# Patient Record
Sex: MALE | Race: WHITE | Employment: FULL TIME | URBAN - METROPOLITAN AREA
[De-identification: names, ages, dates, MRNs, and addresses within clinical notes are randomized per-mention and may not be internally consistent; named-entity substitution may affect disease eponyms.]

---

## 2023-03-15 ENCOUNTER — APPOINTMENT (OUTPATIENT)
Dept: GENERAL RADIOLOGY | Age: 43
DRG: 494 | End: 2023-03-15
Payer: COMMERCIAL

## 2023-03-15 ENCOUNTER — ANESTHESIA (OUTPATIENT)
Dept: OPERATING ROOM | Age: 43
DRG: 494 | End: 2023-03-15
Payer: COMMERCIAL

## 2023-03-15 ENCOUNTER — APPOINTMENT (OUTPATIENT)
Dept: CT IMAGING | Age: 43
DRG: 494 | End: 2023-03-15
Payer: COMMERCIAL

## 2023-03-15 ENCOUNTER — HOSPITAL ENCOUNTER (INPATIENT)
Age: 43
LOS: 1 days | Discharge: HOME | DRG: 494 | End: 2023-03-16
Attending: EMERGENCY MEDICINE | Admitting: INTERNAL MEDICINE
Payer: COMMERCIAL

## 2023-03-15 ENCOUNTER — ANESTHESIA EVENT (OUTPATIENT)
Dept: OPERATING ROOM | Age: 43
DRG: 494 | End: 2023-03-15
Payer: COMMERCIAL

## 2023-03-15 DIAGNOSIS — S82.842A BIMALLEOLAR ANKLE FRACTURE, LEFT, CLOSED, INITIAL ENCOUNTER: Primary | ICD-10-CM

## 2023-03-15 DIAGNOSIS — S82.892A ANKLE FRACTURE, LEFT, CLOSED, INITIAL ENCOUNTER: Primary | ICD-10-CM

## 2023-03-15 PROBLEM — I10 HTN (HYPERTENSION): Status: ACTIVE | Noted: 2023-03-15

## 2023-03-15 PROBLEM — N18.9 CKD (CHRONIC KIDNEY DISEASE): Status: ACTIVE | Noted: 2023-03-15

## 2023-03-15 PROBLEM — D86.9 SARCOID: Status: ACTIVE | Noted: 2023-03-15

## 2023-03-15 LAB
ALBUMIN SERPL-MCNC: 3.8 G/DL (ref 3.4–5)
ALBUMIN/GLOB SERPL: 1.4 {RATIO} (ref 1.1–2.2)
ALP SERPL-CCNC: 132 U/L (ref 40–129)
ALT SERPL-CCNC: 60 U/L (ref 10–40)
ANION GAP SERPL CALCULATED.3IONS-SCNC: 14 MMOL/L (ref 3–16)
AST SERPL-CCNC: 79 U/L (ref 15–37)
BASOPHILS # BLD: 0.1 K/UL (ref 0–0.2)
BASOPHILS NFR BLD: 0.8 %
BILIRUB SERPL-MCNC: 0.5 MG/DL (ref 0–1)
BUN SERPL-MCNC: 20 MG/DL (ref 7–20)
CALCIUM SERPL-MCNC: 8.9 MG/DL (ref 8.3–10.6)
CHLORIDE SERPL-SCNC: 100 MMOL/L (ref 99–110)
CO2 SERPL-SCNC: 24 MMOL/L (ref 21–32)
CREAT SERPL-MCNC: 1.6 MG/DL (ref 0.9–1.3)
DEPRECATED RDW RBC AUTO: 13.7 % (ref 12.4–15.4)
EOSINOPHIL # BLD: 0.2 K/UL (ref 0–0.6)
EOSINOPHIL NFR BLD: 2.7 %
GFR SERPLBLD CREATININE-BSD FMLA CKD-EPI: 55 ML/MIN/{1.73_M2}
GLUCOSE SERPL-MCNC: 152 MG/DL (ref 70–99)
HCT VFR BLD AUTO: 45.1 % (ref 40.5–52.5)
HGB BLD-MCNC: 15.4 G/DL (ref 13.5–17.5)
LYMPHOCYTES # BLD: 1.2 K/UL (ref 1–5.1)
LYMPHOCYTES NFR BLD: 17.2 %
MCH RBC QN AUTO: 30.6 PG (ref 26–34)
MCHC RBC AUTO-ENTMCNC: 34.2 G/DL (ref 31–36)
MCV RBC AUTO: 89.5 FL (ref 80–100)
MONOCYTES # BLD: 0.5 K/UL (ref 0–1.3)
MONOCYTES NFR BLD: 7.9 %
NEUTROPHILS # BLD: 4.9 K/UL (ref 1.7–7.7)
NEUTROPHILS NFR BLD: 71.4 %
PLATELET # BLD AUTO: 209 K/UL (ref 135–450)
PMV BLD AUTO: 9.1 FL (ref 5–10.5)
POTASSIUM SERPL-SCNC: 3.8 MMOL/L (ref 3.5–5.1)
PROT SERPL-MCNC: 6.6 G/DL (ref 6.4–8.2)
RBC # BLD AUTO: 5.04 M/UL (ref 4.2–5.9)
SODIUM SERPL-SCNC: 138 MMOL/L (ref 136–145)
WBC # BLD AUTO: 6.9 K/UL (ref 4–11)

## 2023-03-15 PROCEDURE — 73600 X-RAY EXAM OF ANKLE: CPT

## 2023-03-15 PROCEDURE — 85025 COMPLETE CBC W/AUTO DIFF WBC: CPT

## 2023-03-15 PROCEDURE — 70450 CT HEAD/BRAIN W/O DYE: CPT

## 2023-03-15 PROCEDURE — C1713 ANCHOR/SCREW BN/BN,TIS/BN: HCPCS | Performed by: ORTHOPAEDIC SURGERY

## 2023-03-15 PROCEDURE — 2580000003 HC RX 258: Performed by: NURSE PRACTITIONER

## 2023-03-15 PROCEDURE — 2500000003 HC RX 250 WO HCPCS: Performed by: ORTHOPAEDIC SURGERY

## 2023-03-15 PROCEDURE — 2580000003 HC RX 258: Performed by: INTERNAL MEDICINE

## 2023-03-15 PROCEDURE — 99285 EMERGENCY DEPT VISIT HI MDM: CPT

## 2023-03-15 PROCEDURE — 73610 X-RAY EXAM OF ANKLE: CPT

## 2023-03-15 PROCEDURE — 3600000004 HC SURGERY LEVEL 4 BASE: Performed by: ORTHOPAEDIC SURGERY

## 2023-03-15 PROCEDURE — 6360000002 HC RX W HCPCS: Performed by: ORTHOPAEDIC SURGERY

## 2023-03-15 PROCEDURE — 2500000003 HC RX 250 WO HCPCS: Performed by: NURSE ANESTHETIST, CERTIFIED REGISTERED

## 2023-03-15 PROCEDURE — 6360000002 HC RX W HCPCS: Performed by: NURSE ANESTHETIST, CERTIFIED REGISTERED

## 2023-03-15 PROCEDURE — 6360000002 HC RX W HCPCS: Performed by: INTERNAL MEDICINE

## 2023-03-15 PROCEDURE — 73590 X-RAY EXAM OF LOWER LEG: CPT

## 2023-03-15 PROCEDURE — 96374 THER/PROPH/DIAG INJ IV PUSH: CPT

## 2023-03-15 PROCEDURE — 7100000000 HC PACU RECOVERY - FIRST 15 MIN: Performed by: ORTHOPAEDIC SURGERY

## 2023-03-15 PROCEDURE — 2580000003 HC RX 258: Performed by: ORTHOPAEDIC SURGERY

## 2023-03-15 PROCEDURE — 6370000000 HC RX 637 (ALT 250 FOR IP): Performed by: NURSE PRACTITIONER

## 2023-03-15 PROCEDURE — 3600000014 HC SURGERY LEVEL 4 ADDTL 15MIN: Performed by: ORTHOPAEDIC SURGERY

## 2023-03-15 PROCEDURE — 7100000001 HC PACU RECOVERY - ADDTL 15 MIN: Performed by: ORTHOPAEDIC SURGERY

## 2023-03-15 PROCEDURE — A4217 STERILE WATER/SALINE, 500 ML: HCPCS | Performed by: ORTHOPAEDIC SURGERY

## 2023-03-15 PROCEDURE — 80053 COMPREHEN METABOLIC PANEL: CPT

## 2023-03-15 PROCEDURE — 27840 TREAT ANKLE DISLOCATION: CPT

## 2023-03-15 PROCEDURE — 0QSK04Z REPOSITION LEFT FIBULA WITH INTERNAL FIXATION DEVICE, OPEN APPROACH: ICD-10-PCS | Performed by: ORTHOPAEDIC SURGERY

## 2023-03-15 PROCEDURE — 2720000010 HC SURG SUPPLY STERILE: Performed by: ORTHOPAEDIC SURGERY

## 2023-03-15 PROCEDURE — C1769 GUIDE WIRE: HCPCS | Performed by: ORTHOPAEDIC SURGERY

## 2023-03-15 PROCEDURE — 6360000002 HC RX W HCPCS: Performed by: EMERGENCY MEDICINE

## 2023-03-15 PROCEDURE — 6370000000 HC RX 637 (ALT 250 FOR IP): Performed by: INTERNAL MEDICINE

## 2023-03-15 PROCEDURE — 2500000003 HC RX 250 WO HCPCS: Performed by: EMERGENCY MEDICINE

## 2023-03-15 PROCEDURE — 2709999900 HC NON-CHARGEABLE SUPPLY: Performed by: ORTHOPAEDIC SURGERY

## 2023-03-15 PROCEDURE — 3700000000 HC ANESTHESIA ATTENDED CARE: Performed by: ORTHOPAEDIC SURGERY

## 2023-03-15 PROCEDURE — 3700000001 HC ADD 15 MINUTES (ANESTHESIA): Performed by: ORTHOPAEDIC SURGERY

## 2023-03-15 PROCEDURE — 1200000000 HC SEMI PRIVATE

## 2023-03-15 PROCEDURE — 6360000002 HC RX W HCPCS

## 2023-03-15 PROCEDURE — APPNB45 APP NON BILLABLE 31-45 MINUTES: Performed by: NURSE PRACTITIONER

## 2023-03-15 PROCEDURE — 36415 COLL VENOUS BLD VENIPUNCTURE: CPT

## 2023-03-15 DEVICE — SCREW BNE L50MM DIA4MM S STL CANN LNG HALF THRD SM HEX SOCK: Type: IMPLANTABLE DEVICE | Site: ANKLE | Status: FUNCTIONAL

## 2023-03-15 DEVICE — SCREW BNE L20MM DIA2.7MM CORT S STL ST LOK FULL THRD T8: Type: IMPLANTABLE DEVICE | Site: ANKLE | Status: FUNCTIONAL

## 2023-03-15 DEVICE — SCREW BNE L18MM DIA2.7MM CORT S STL ST LOK FULL THRD T8: Type: IMPLANTABLE DEVICE | Site: ANKLE | Status: FUNCTIONAL

## 2023-03-15 DEVICE — SCREW BNE L22MM DIA2.7MM CORT S STL ST LOK FULL THRD T8: Type: IMPLANTABLE DEVICE | Site: ANKLE | Status: FUNCTIONAL

## 2023-03-15 DEVICE — SCREW BNE L14MM DIA3.5MM CORT S STL ST NONCANNULATED LOK: Type: IMPLANTABLE DEVICE | Site: ANKLE | Status: FUNCTIONAL

## 2023-03-15 DEVICE — SCREW BNE L16MM DIA2.7MM CORT S STL ST LOK FULL THRD T8: Type: IMPLANTABLE DEVICE | Site: ANKLE | Status: FUNCTIONAL

## 2023-03-15 DEVICE — GRAFT BNE SUB 15CC PARTIC 4-9.5MM CANC FRZ DRY CHIP 27615015] ALLOSOURCE]: Type: IMPLANTABLE DEVICE | Site: ANKLE | Status: FUNCTIONAL

## 2023-03-15 DEVICE — SCREW BNE L16MM DIA3.5MM CORT S STL ST NONCANNULATED LOK: Type: IMPLANTABLE DEVICE | Site: ANKLE | Status: FUNCTIONAL

## 2023-03-15 DEVICE — SCREW BNE L14MM DIA2.7MM CORT S STL ST LOK FULL THRD T8: Type: IMPLANTABLE DEVICE | Site: ANKLE | Status: FUNCTIONAL

## 2023-03-15 DEVICE — PLATE BNE L99MM 5 H L DST LAT FIBULAR S STL LOK COMPR FOR: Type: IMPLANTABLE DEVICE | Site: ANKLE | Status: FUNCTIONAL

## 2023-03-15 RX ORDER — FENTANYL CITRATE 50 UG/ML
INJECTION, SOLUTION INTRAMUSCULAR; INTRAVENOUS PRN
Status: DISCONTINUED | OUTPATIENT
Start: 2023-03-15 | End: 2023-03-15 | Stop reason: SDUPTHER

## 2023-03-15 RX ORDER — PROPOFOL 10 MG/ML
10 INJECTION, EMULSION INTRAVENOUS CONTINUOUS
Status: DISCONTINUED | OUTPATIENT
Start: 2023-03-15 | End: 2023-03-15 | Stop reason: HOSPADM

## 2023-03-15 RX ORDER — LABETALOL HYDROCHLORIDE 5 MG/ML
10 INJECTION, SOLUTION INTRAVENOUS
Status: DISCONTINUED | OUTPATIENT
Start: 2023-03-15 | End: 2023-03-15 | Stop reason: HOSPADM

## 2023-03-15 RX ORDER — PROPOFOL 10 MG/ML
INJECTION, EMULSION INTRAVENOUS PRN
Status: DISCONTINUED | OUTPATIENT
Start: 2023-03-15 | End: 2023-03-15 | Stop reason: SDUPTHER

## 2023-03-15 RX ORDER — ONDANSETRON 2 MG/ML
4 INJECTION INTRAMUSCULAR; INTRAVENOUS
Status: DISCONTINUED | OUTPATIENT
Start: 2023-03-15 | End: 2023-03-15 | Stop reason: HOSPADM

## 2023-03-15 RX ORDER — POLYETHYLENE GLYCOL 3350 17 G/17G
17 POWDER, FOR SOLUTION ORAL DAILY PRN
Status: DISCONTINUED | OUTPATIENT
Start: 2023-03-15 | End: 2023-03-16 | Stop reason: HOSPADM

## 2023-03-15 RX ORDER — OXYCODONE HYDROCHLORIDE 5 MG/1
5 TABLET ORAL
Status: DISCONTINUED | OUTPATIENT
Start: 2023-03-15 | End: 2023-03-15 | Stop reason: HOSPADM

## 2023-03-15 RX ORDER — KETAMINE HCL IN NACL, ISO-OSM 100MG/10ML
SYRINGE (ML) INJECTION PRN
Status: DISCONTINUED | OUTPATIENT
Start: 2023-03-15 | End: 2023-03-15 | Stop reason: SDUPTHER

## 2023-03-15 RX ORDER — ALLOPURINOL 300 MG/1
TABLET ORAL
COMMUNITY
Start: 2021-11-10

## 2023-03-15 RX ORDER — SODIUM CHLORIDE 0.9 % (FLUSH) 0.9 %
5-40 SYRINGE (ML) INJECTION PRN
Status: DISCONTINUED | OUTPATIENT
Start: 2023-03-15 | End: 2023-03-16 | Stop reason: HOSPADM

## 2023-03-15 RX ORDER — COLCHICINE 0.6 MG/1
TABLET ORAL
COMMUNITY
Start: 2021-11-10

## 2023-03-15 RX ORDER — DEXAMETHASONE SODIUM PHOSPHATE 4 MG/ML
INJECTION, SOLUTION INTRA-ARTICULAR; INTRALESIONAL; INTRAMUSCULAR; INTRAVENOUS; SOFT TISSUE PRN
Status: DISCONTINUED | OUTPATIENT
Start: 2023-03-15 | End: 2023-03-15 | Stop reason: SDUPTHER

## 2023-03-15 RX ORDER — HYDROMORPHONE HYDROCHLORIDE 1 MG/ML
1 INJECTION, SOLUTION INTRAMUSCULAR; INTRAVENOUS; SUBCUTANEOUS EVERY 4 HOURS PRN
Status: DISCONTINUED | OUTPATIENT
Start: 2023-03-15 | End: 2023-03-16 | Stop reason: HOSPADM

## 2023-03-15 RX ORDER — HYDROXYCHLOROQUINE SULFATE 200 MG/1
TABLET, FILM COATED ORAL
COMMUNITY
Start: 2016-11-07

## 2023-03-15 RX ORDER — DEXMEDETOMIDINE HYDROCHLORIDE 100 UG/ML
INJECTION, SOLUTION INTRAVENOUS PRN
Status: DISCONTINUED | OUTPATIENT
Start: 2023-03-15 | End: 2023-03-15 | Stop reason: SDUPTHER

## 2023-03-15 RX ORDER — SODIUM CHLORIDE 9 MG/ML
INJECTION, SOLUTION INTRAVENOUS CONTINUOUS
Status: DISCONTINUED | OUTPATIENT
Start: 2023-03-15 | End: 2023-03-16 | Stop reason: HOSPADM

## 2023-03-15 RX ORDER — ONDANSETRON 2 MG/ML
INJECTION INTRAMUSCULAR; INTRAVENOUS PRN
Status: DISCONTINUED | OUTPATIENT
Start: 2023-03-15 | End: 2023-03-15 | Stop reason: SDUPTHER

## 2023-03-15 RX ORDER — MAGNESIUM HYDROXIDE 1200 MG/15ML
LIQUID ORAL CONTINUOUS PRN
Status: COMPLETED | OUTPATIENT
Start: 2023-03-15 | End: 2023-03-15

## 2023-03-15 RX ORDER — ALLOPURINOL 300 MG/1
300 TABLET ORAL DAILY
Status: DISCONTINUED | OUTPATIENT
Start: 2023-03-15 | End: 2023-03-16 | Stop reason: HOSPADM

## 2023-03-15 RX ORDER — HYDROMORPHONE HYDROCHLORIDE 1 MG/ML
0.5 INJECTION, SOLUTION INTRAMUSCULAR; INTRAVENOUS; SUBCUTANEOUS EVERY 4 HOURS PRN
Status: DISCONTINUED | OUTPATIENT
Start: 2023-03-15 | End: 2023-03-16 | Stop reason: HOSPADM

## 2023-03-15 RX ORDER — MORPHINE SULFATE 4 MG/ML
4 INJECTION, SOLUTION INTRAMUSCULAR; INTRAVENOUS ONCE
Status: COMPLETED | OUTPATIENT
Start: 2023-03-15 | End: 2023-03-15

## 2023-03-15 RX ORDER — HYDROMORPHONE HYDROCHLORIDE 1 MG/ML
1 INJECTION, SOLUTION INTRAMUSCULAR; INTRAVENOUS; SUBCUTANEOUS ONCE
Status: DISCONTINUED | OUTPATIENT
Start: 2023-03-15 | End: 2023-03-15

## 2023-03-15 RX ORDER — OXYCODONE HYDROCHLORIDE 5 MG/1
10 TABLET ORAL EVERY 4 HOURS PRN
Status: DISCONTINUED | OUTPATIENT
Start: 2023-03-15 | End: 2023-03-16 | Stop reason: HOSPADM

## 2023-03-15 RX ORDER — LIDOCAINE HYDROCHLORIDE 20 MG/ML
INJECTION, SOLUTION EPIDURAL; INFILTRATION; INTRACAUDAL; PERINEURAL PRN
Status: DISCONTINUED | OUTPATIENT
Start: 2023-03-15 | End: 2023-03-15 | Stop reason: SDUPTHER

## 2023-03-15 RX ORDER — MIDAZOLAM HYDROCHLORIDE 1 MG/ML
INJECTION INTRAMUSCULAR; INTRAVENOUS PRN
Status: DISCONTINUED | OUTPATIENT
Start: 2023-03-15 | End: 2023-03-15 | Stop reason: SDUPTHER

## 2023-03-15 RX ORDER — LOSARTAN POTASSIUM 25 MG/1
50 TABLET ORAL DAILY
Status: DISCONTINUED | OUTPATIENT
Start: 2023-03-15 | End: 2023-03-16 | Stop reason: HOSPADM

## 2023-03-15 RX ORDER — NICOTINE 21 MG/24HR
1 PATCH, TRANSDERMAL 24 HOURS TRANSDERMAL DAILY
Status: DISCONTINUED | OUTPATIENT
Start: 2023-03-15 | End: 2023-03-16 | Stop reason: HOSPADM

## 2023-03-15 RX ORDER — SODIUM CHLORIDE 0.9 % (FLUSH) 0.9 %
5-40 SYRINGE (ML) INJECTION EVERY 12 HOURS SCHEDULED
Status: DISCONTINUED | OUTPATIENT
Start: 2023-03-15 | End: 2023-03-16 | Stop reason: HOSPADM

## 2023-03-15 RX ORDER — LOSARTAN POTASSIUM 50 MG/1
TABLET ORAL DAILY
COMMUNITY
Start: 2021-08-20

## 2023-03-15 RX ORDER — ENOXAPARIN SODIUM 100 MG/ML
40 INJECTION SUBCUTANEOUS DAILY
Status: DISCONTINUED | OUTPATIENT
Start: 2023-03-16 | End: 2023-03-15

## 2023-03-15 RX ORDER — ACETAMINOPHEN 650 MG/1
650 SUPPOSITORY RECTAL EVERY 6 HOURS PRN
Status: DISCONTINUED | OUTPATIENT
Start: 2023-03-15 | End: 2023-03-16 | Stop reason: HOSPADM

## 2023-03-15 RX ORDER — OXYCODONE HYDROCHLORIDE 5 MG/1
5 TABLET ORAL EVERY 4 HOURS PRN
Status: DISCONTINUED | OUTPATIENT
Start: 2023-03-15 | End: 2023-03-16 | Stop reason: HOSPADM

## 2023-03-15 RX ORDER — ROCURONIUM BROMIDE 10 MG/ML
INJECTION, SOLUTION INTRAVENOUS PRN
Status: DISCONTINUED | OUTPATIENT
Start: 2023-03-15 | End: 2023-03-15 | Stop reason: SDUPTHER

## 2023-03-15 RX ORDER — ENOXAPARIN SODIUM 100 MG/ML
40 INJECTION SUBCUTANEOUS DAILY
Status: DISCONTINUED | OUTPATIENT
Start: 2023-03-16 | End: 2023-03-16 | Stop reason: HOSPADM

## 2023-03-15 RX ORDER — KETAMINE HYDROCHLORIDE 50 MG/ML
1 INJECTION, SOLUTION, CONCENTRATE INTRAMUSCULAR; INTRAVENOUS ONCE
Status: COMPLETED | OUTPATIENT
Start: 2023-03-15 | End: 2023-03-15

## 2023-03-15 RX ORDER — PROPOFOL 10 MG/ML
5-50 INJECTION, EMULSION INTRAVENOUS ONCE
Status: DISCONTINUED | OUTPATIENT
Start: 2023-03-15 | End: 2023-03-15

## 2023-03-15 RX ORDER — ENOXAPARIN SODIUM 100 MG/ML
30 INJECTION SUBCUTANEOUS 2 TIMES DAILY
Status: DISCONTINUED | OUTPATIENT
Start: 2023-03-16 | End: 2023-03-15

## 2023-03-15 RX ORDER — MEPERIDINE HYDROCHLORIDE 25 MG/ML
12.5 INJECTION INTRAMUSCULAR; INTRAVENOUS; SUBCUTANEOUS EVERY 5 MIN PRN
Status: DISCONTINUED | OUTPATIENT
Start: 2023-03-15 | End: 2023-03-15 | Stop reason: HOSPADM

## 2023-03-15 RX ORDER — BUPIVACAINE HYDROCHLORIDE 5 MG/ML
INJECTION, SOLUTION EPIDURAL; INTRACAUDAL
Status: COMPLETED | OUTPATIENT
Start: 2023-03-15 | End: 2023-03-15

## 2023-03-15 RX ORDER — PREDNISONE 1 MG/1
5 TABLET ORAL DAILY
Status: DISCONTINUED | OUTPATIENT
Start: 2023-03-15 | End: 2023-03-16 | Stop reason: HOSPADM

## 2023-03-15 RX ORDER — MORPHINE SULFATE 4 MG/ML
4 INJECTION, SOLUTION INTRAMUSCULAR; INTRAVENOUS ONCE
Status: DISCONTINUED | OUTPATIENT
Start: 2023-03-15 | End: 2023-03-15

## 2023-03-15 RX ORDER — KETAMINE HYDROCHLORIDE 50 MG/ML
1 INJECTION, SOLUTION, CONCENTRATE INTRAMUSCULAR; INTRAVENOUS ONCE
Status: DISCONTINUED | OUTPATIENT
Start: 2023-03-15 | End: 2023-03-15

## 2023-03-15 RX ORDER — SODIUM CHLORIDE 9 MG/ML
INJECTION, SOLUTION INTRAVENOUS PRN
Status: DISCONTINUED | OUTPATIENT
Start: 2023-03-15 | End: 2023-03-16 | Stop reason: HOSPADM

## 2023-03-15 RX ORDER — HYDROMORPHONE HCL 110MG/55ML
0.5 PATIENT CONTROLLED ANALGESIA SYRINGE INTRAVENOUS EVERY 5 MIN PRN
Status: DISCONTINUED | OUTPATIENT
Start: 2023-03-15 | End: 2023-03-15 | Stop reason: HOSPADM

## 2023-03-15 RX ORDER — HYDROMORPHONE HCL 110MG/55ML
PATIENT CONTROLLED ANALGESIA SYRINGE INTRAVENOUS PRN
Status: DISCONTINUED | OUTPATIENT
Start: 2023-03-15 | End: 2023-03-15 | Stop reason: SDUPTHER

## 2023-03-15 RX ORDER — PROPOFOL 10 MG/ML
INJECTION, EMULSION INTRAVENOUS
Status: COMPLETED
Start: 2023-03-15 | End: 2023-03-15

## 2023-03-15 RX ORDER — HYDRALAZINE HYDROCHLORIDE 20 MG/ML
10 INJECTION INTRAMUSCULAR; INTRAVENOUS
Status: DISCONTINUED | OUTPATIENT
Start: 2023-03-15 | End: 2023-03-15 | Stop reason: HOSPADM

## 2023-03-15 RX ORDER — ACETAMINOPHEN 325 MG/1
650 TABLET ORAL EVERY 6 HOURS PRN
Status: DISCONTINUED | OUTPATIENT
Start: 2023-03-15 | End: 2023-03-16 | Stop reason: HOSPADM

## 2023-03-15 RX ADMIN — Medication 20 MG: at 16:29

## 2023-03-15 RX ADMIN — KETAMINE HYDROCHLORIDE 115 MG: 50 INJECTION, SOLUTION INTRAMUSCULAR; INTRAVENOUS at 04:35

## 2023-03-15 RX ADMIN — HYDROMORPHONE HYDROCHLORIDE 1 MG: 1 INJECTION, SOLUTION INTRAMUSCULAR; INTRAVENOUS; SUBCUTANEOUS at 07:00

## 2023-03-15 RX ADMIN — Medication 5 ML: at 10:06

## 2023-03-15 RX ADMIN — MIDAZOLAM 2 MG: 1 INJECTION INTRAMUSCULAR; INTRAVENOUS at 15:53

## 2023-03-15 RX ADMIN — SODIUM CHLORIDE: 9 INJECTION, SOLUTION INTRAVENOUS at 10:04

## 2023-03-15 RX ADMIN — Medication 3000 MG: at 15:42

## 2023-03-15 RX ADMIN — HYDROMORPHONE HYDROCHLORIDE 0.5 MG: 2 INJECTION, SOLUTION INTRAMUSCULAR; INTRAVENOUS; SUBCUTANEOUS at 16:29

## 2023-03-15 RX ADMIN — PREDNISONE 5 MG: 5 TABLET ORAL at 10:00

## 2023-03-15 RX ADMIN — Medication 30 MG: at 16:10

## 2023-03-15 RX ADMIN — PROPOFOL 60 MG: 10 INJECTION, EMULSION INTRAVENOUS at 17:40

## 2023-03-15 RX ADMIN — ALLOPURINOL 300 MG: 300 TABLET ORAL at 10:00

## 2023-03-15 RX ADMIN — LIDOCAINE HYDROCHLORIDE 80 MG: 20 INJECTION, SOLUTION EPIDURAL; INFILTRATION; INTRACAUDAL; PERINEURAL at 15:57

## 2023-03-15 RX ADMIN — OXYCODONE 10 MG: 5 TABLET ORAL at 13:13

## 2023-03-15 RX ADMIN — MORPHINE SULFATE 4 MG: 4 INJECTION, SOLUTION INTRAMUSCULAR; INTRAVENOUS at 03:25

## 2023-03-15 RX ADMIN — DEXMEDETOMIDINE HYDROCHLORIDE 4 MCG: 100 INJECTION, SOLUTION INTRAVENOUS at 16:12

## 2023-03-15 RX ADMIN — ROCURONIUM BROMIDE 10 MG: 10 INJECTION, SOLUTION INTRAVENOUS at 16:30

## 2023-03-15 RX ADMIN — DEXMEDETOMIDINE HYDROCHLORIDE 4 MCG: 100 INJECTION, SOLUTION INTRAVENOUS at 17:20

## 2023-03-15 RX ADMIN — DEXAMETHASONE SODIUM PHOSPHATE 8 MG: 4 INJECTION, SOLUTION INTRAMUSCULAR; INTRAVENOUS at 16:15

## 2023-03-15 RX ADMIN — HYDROMORPHONE HYDROCHLORIDE 1 MG: 1 INJECTION, SOLUTION INTRAMUSCULAR; INTRAVENOUS; SUBCUTANEOUS at 11:08

## 2023-03-15 RX ADMIN — LOSARTAN POTASSIUM 50 MG: 25 TABLET, FILM COATED ORAL at 09:59

## 2023-03-15 RX ADMIN — PROPOFOL 10 MG: 10 INJECTION, EMULSION INTRAVENOUS at 04:35

## 2023-03-15 RX ADMIN — OXYCODONE 10 MG: 5 TABLET ORAL at 09:05

## 2023-03-15 RX ADMIN — OXYCODONE HYDROCHLORIDE 5 MG: 5 TABLET ORAL at 21:39

## 2023-03-15 RX ADMIN — DEXMEDETOMIDINE HYDROCHLORIDE 4 MCG: 100 INJECTION, SOLUTION INTRAVENOUS at 17:25

## 2023-03-15 RX ADMIN — HYDROMORPHONE HYDROCHLORIDE 0.5 MG: 2 INJECTION, SOLUTION INTRAMUSCULAR; INTRAVENOUS; SUBCUTANEOUS at 17:35

## 2023-03-15 RX ADMIN — SUGAMMADEX 200 MG: 100 INJECTION, SOLUTION INTRAVENOUS at 17:46

## 2023-03-15 RX ADMIN — HYDROMORPHONE HYDROCHLORIDE 0.5 MG: 2 INJECTION, SOLUTION INTRAMUSCULAR; INTRAVENOUS; SUBCUTANEOUS at 16:17

## 2023-03-15 RX ADMIN — DEXMEDETOMIDINE HYDROCHLORIDE 4 MCG: 100 INJECTION, SOLUTION INTRAVENOUS at 16:29

## 2023-03-15 RX ADMIN — PROPOFOL 200 MG: 10 INJECTION, EMULSION INTRAVENOUS at 15:57

## 2023-03-15 RX ADMIN — DEXMEDETOMIDINE HYDROCHLORIDE 4 MCG: 100 INJECTION, SOLUTION INTRAVENOUS at 17:33

## 2023-03-15 RX ADMIN — FENTANYL CITRATE 100 MCG: 50 INJECTION, SOLUTION INTRAMUSCULAR; INTRAVENOUS at 15:57

## 2023-03-15 RX ADMIN — ROCURONIUM BROMIDE 50 MG: 10 INJECTION, SOLUTION INTRAVENOUS at 16:09

## 2023-03-15 RX ADMIN — ONDANSETRON 4 MG: 2 INJECTION INTRAMUSCULAR; INTRAVENOUS at 17:24

## 2023-03-15 RX ADMIN — HYDROMORPHONE HYDROCHLORIDE 0.5 MG: 2 INJECTION, SOLUTION INTRAMUSCULAR; INTRAVENOUS; SUBCUTANEOUS at 17:25

## 2023-03-15 ASSESSMENT — PAIN SCALES - GENERAL
PAINLEVEL_OUTOF10: 5
PAINLEVEL_OUTOF10: 5
PAINLEVEL_OUTOF10: 3
PAINLEVEL_OUTOF10: 8
PAINLEVEL_OUTOF10: 7
PAINLEVEL_OUTOF10: 5
PAINLEVEL_OUTOF10: 7
PAINLEVEL_OUTOF10: 3
PAINLEVEL_OUTOF10: 2
PAINLEVEL_OUTOF10: 6
PAINLEVEL_OUTOF10: 5
PAINLEVEL_OUTOF10: 5
PAINLEVEL_OUTOF10: 7
PAINLEVEL_OUTOF10: 10

## 2023-03-15 ASSESSMENT — PAIN - FUNCTIONAL ASSESSMENT
PAIN_FUNCTIONAL_ASSESSMENT: 0-10
PAIN_FUNCTIONAL_ASSESSMENT: 0-10
PAIN_FUNCTIONAL_ASSESSMENT: PREVENTS OR INTERFERES WITH ALL ACTIVE AND SOME PASSIVE ACTIVITIES
PAIN_FUNCTIONAL_ASSESSMENT: PREVENTS OR INTERFERES SOME ACTIVE ACTIVITIES AND ADLS

## 2023-03-15 ASSESSMENT — PAIN DESCRIPTION - LOCATION
LOCATION: ANKLE

## 2023-03-15 ASSESSMENT — LIFESTYLE VARIABLES
SMOKING_STATUS: 1
HOW MANY STANDARD DRINKS CONTAINING ALCOHOL DO YOU HAVE ON A TYPICAL DAY: PATIENT DOES NOT DRINK
HOW OFTEN DO YOU HAVE A DRINK CONTAINING ALCOHOL: NEVER

## 2023-03-15 ASSESSMENT — PAIN DESCRIPTION - ORIENTATION
ORIENTATION: LEFT

## 2023-03-15 ASSESSMENT — PAIN DESCRIPTION - DESCRIPTORS
DESCRIPTORS: ACHING
DESCRIPTORS: ACHING;SHOOTING
DESCRIPTORS: ACHING

## 2023-03-15 ASSESSMENT — PAIN DESCRIPTION - PAIN TYPE
TYPE: SURGICAL PAIN
TYPE: SURGICAL PAIN

## 2023-03-15 ASSESSMENT — ENCOUNTER SYMPTOMS: SHORTNESS OF BREATH: 1

## 2023-03-15 NOTE — ANESTHESIA POSTPROCEDURE EVALUATION
Department of Anesthesiology  Postprocedure Note    Patient: Gordon Amezquita  MRN: [id number]  YOB: 1980  Date of evaluation: 3/15/2023      Procedure Summary     Date: 03/15/23 Room / Location: [address]    Anesthesia Start: 1553 Anesthesia Stop: 3940    Procedure: OPEN REDUCTION INTERNAL FIXATION LEFT ANKLE-SYNTHES (Left) Diagnosis:       Closed left ankle fracture, initial encounter      (W43.083C LEFT ANKLE FRACTURE)    Surgeons: Hannah Rubin MD Responsible Provider: George Goins MD    Anesthesia Type: general ASA Status: 2          Anesthesia Type: No value filed.     Nahomi Phase I: Nahomi Score: 9    Nahomi Phase II:        Anesthesia Post Evaluation    Patient location during evaluation: PACU  Patient participation: complete - patient participated  Level of consciousness: awake and alert  Airway patency: patent  Nausea & Vomiting: no vomiting and no nausea  Complications: no  Cardiovascular status: hemodynamically stable  Respiratory status: acceptable  Hydration status: stable

## 2023-03-15 NOTE — LETTER
March 16, 2023       Marina Flores YOB: 1980   [address] [facility] Date of Visit:  3/15/2023       To Whom It May Concern: It is my medical opinion that Marina Flores should remain out of work until at least cleared by surgeon, not before 2 months post-op (surgery date was 3/15/23). If you have any questions or concerns, please don't hesitate to call.     Sincerely,          Fam Oreilly, CNP

## 2023-03-15 NOTE — ED PROVIDER NOTES
[address] [facility]  eMERGENCY dEPARTMENT eNCOUnter        Pt Name: Issa Tavares  MRN: [id number]  Armsdyangfmaximilian 1980  Date of evaluation: 3/15/2023  Provider: Kathie Malik MD  PCP: Abhay Mcleod MD      CHIEF COMPLAINT       Chief Complaint   Patient presents with    Fall     Pt arrives in the ED c/o ankle pain. Pt was on a ladder and fell off about 6 feet off the ground. Pt landed on his ankle 10/10 pain and hit his head. .  Pt stated his vision was double. Pt still has feeling in his toes        HISTORY OFPRESENT ILLNESS   (Location/Symptom, Timing/Onset, Context/Setting, Quality, Duration, Modifying Factors,Severity)  Note limiting factors. Issa Tavares is a 40 y.o. male patient was 6 feet up on a ladder and fell landing on his left foot thinks he may have hit his head but there was no loss of consciousness patient complains of left ankle pain little bit of pain in the left posterior chest denies any headache when the friend arrived he states he witnessed the fall and he caught his leg in the latter and that may have contributed to the twisting injury to the left ankle does have a history of chronic kidney disease and takes hydroxychloroquine and prednisone    Nursing Notes were all reviewed and agreed with or any disagreements were addressed  in the HPI. REVIEW OF SYSTEMS    (2-9 systems for level 4, 10 or more for level 5)       REVIEW OF SYSTEMS    Constitutional:  Denies fever, chills, or weakness   Eyes:  Denies vision changes  HENT:  Denies sore throat or neck pain   Respiratory:  Denies cough or shortness of breath   Cardiovascular:  Denies chest pain  GI:  Denies abdominal pain, nausea, vomiting, or diarrhea   Musculoskeletal:  Denies back pain   Skin: no rash or vesicles   Neurologic:  no headache weakness focal    Lymphatic:  no swollen  nodes   Psychiatric: no si or hs thoughts     All systems negative except as marked.      Positives and Pertinent negatives as per HPI. Except as noted above in the ROS, all other systems were reviewed andnegative. PASTMEDICAL HISTORY     Past Medical History:   Diagnosis Date    Chronic kidney disease     Hypercalcemia     Sarcoidosis          SURGICAL HISTORY       Past Surgical History:   Procedure Laterality Date    NECK SURGERY      ORTHOPEDIC SURGERY           CURRENT MEDICATIONS       Previous Medications    ALLOPURINOL (ZYLOPRIM) 300 MG TABLET    TAKE ONE BY MOUTH DAILY    AMLODIPINE (NORVASC) 2.5 MG TABLET    Take 2.5 mg by mouth daily    COLCHICINE (COLCRYS) 0.6 MG TABLET    TAKE ONE TABLET (0.6 MG TOTAL) BY MOUTH DAILY.  INDICATIONS: ACUTE GOUTY ARTHRITIS    FOLIC ACID PO    Take 1 mg by mouth daily     HYDROXYCHLOROQUINE (PLAQUENIL) 200 MG TABLET    TAKE ONE BY MOUTH TWICE DAILY    KETOCONAZOLE (NIZORAL) 200 MG TABLET    Take 400 mg by mouth daily Patient decided to break dose up to one tablet twice a day    LISINOPRIL (PRINIVIL;ZESTRIL) 10 MG TABLET    Take 10 mg by mouth 2 times daily    LOSARTAN (COZAAR) 50 MG TABLET    Take by mouth daily    POTASSIUM CHLORIDE SA (K-DUR;KLOR-CON M) 10 MEQ TABLET    Take 10 mEq by mouth daily    PREDNISONE 5 MG TBEC    Take 30 mg by mouth daily       ALLERGIES     Vitamin d, Aspartame and phenylalanine, Aspirin, and Magnesium    FAMILY HISTORY       Family History   Problem Relation Age of Onset    Cancer Mother         breast    Other Father         sarcoidosis    Cancer Father         prostate          SOCIAL HISTORY       Social History     Socioeconomic History    Marital status: Single     Spouse name: None    Number of children: 0    Years of education: None    Highest education level: None   Tobacco Use    Smoking status: Every Day     Packs/day: 2.00     Years: 15.00     Pack years: 30.00     Types: Cigarettes   Substance and Sexual Activity    Alcohol use: Yes     Comment: last drink maybe one week ago (11/17/15)    Drug use: Yes     Types: Marijuana (Weed) Comment: occ       SCREENINGS    Tyree Coma Scale  Eye Opening: Spontaneous  Best Verbal Response: Oriented  Best Motor Response: Obeys commands  Tyree Coma Scale Score: 15        PHYSICAL EXAM    (up to 7 for level 4, 8 or more for level 5)     ED Triage Vitals   BP Temp Temp src Heart Rate Resp SpO2 Height Weight   03/15/23 0247 03/15/23 0246 -- 03/15/23 0246 03/15/23 0247 03/15/23 0247 -- --   110/73 97.5 °F (36.4 °C)  93 22 96 %             General Appearance:  Alert, cooperative, no distress, appears stated age. Head:  Normocephalic, without obvious abnormality, atraumatic. Eyes:  conjunctiva/corneas clear, EOM's intact. Sclera anicteric. ENT: Mucous membranes moist.   Neck: Supple, symmetrical, trachea midline, no adenopathy. No jugular venous distention. Lungs:   No Respiratory Distress. no rales  rhonchi rub   Chest Wall:  Nontender  no deformity   Heart:  Rsr no murmer gallop    Abdomen:   Soft nontender no organomegally    Extremities:  Full range of motion. no deformity   Pulses: Equal  upper and lower    Skin:  No rashes or lesions to exposed skin. Neurologic: Alert and oriented X 3. Motor grossly normal.  Speech clear. Cr n 2-12 intact   Pt has swelling and deviation to lateral aspect  of ankle     DIAGNOSTIC RESULTS   LABS:    Labs Reviewed   COMPREHENSIVE METABOLIC PANEL W/ REFLEX TO MG FOR LOW K - Abnormal; Notable for the following components:       Result Value    Glucose 152 (*)     Creatinine 1.6 (*)     Est, Glom Filt Rate 55 (*)     Alkaline Phosphatase 132 (*)     ALT 60 (*)     AST 79 (*)     All other components within normal limits   CBC WITH AUTO DIFFERENTIAL       All other labs were within normal range or not returned as of thisdictation. EKG:  All EKG's are interpreted by the Emergency Department Physician who either signs or Co-signs this chart in the absence of a cardiologist.        RADIOLOGY:   Non-plain film images such as CT, Ultrasound and MRI are read by the tatyana Mckinley images are visualized and preliminarily interpreted by the  ED Provider with the belowfindings:        Interpretation per the Radiologist below, if available at the time of this note:    XR ANKLE LEFT (MIN 3 VIEWS)   Final Result   Unchanged alignment status post reduction and casting. XR ANKLE LEFT (MIN 3 VIEWS)   Final Result   Acute bimalleolar ankle fracture/dislocation. CT Head W/O Contrast   Final Result   No acute intracranial abnormality. XR TIBIA FIBULA LEFT (2 VIEWS)   Final Result   Acute bimalleolar ankle fracture/dislocation. PROCEDURES   Unless otherwise noted below, none     Procedures PROCEDURE:  PROCEDURAL SEDATION    Pre-sedation Assessment    Intended Procedure: Reduction of ankle dislocation    Pre-Procedure Assessment/Plan:  Airway:Normal  ASA 2 - Patient with mild systemic disease with no functional limitations    Level of Sedation Plan: Moderate sedation    Post Procedure plan: Return to same level of care    I assessed the patient and find that the patient is in satisfactory condition to proceed with the planned procedure and sedation plan. The benefits, risks, and alternatives of procedural sedation were discussed with David Snell or their surrogate. We discussed the potential side effects or adverse reactions that could occur with sedation with propofol and ketamine. An opportunity to ask questions was provided, and consent was given for the procedure. Oxygen was administered, and the appropriate pre-procedural policies were followed. Hospital protocol for cardiac, oxygen saturation, and blood pressure monitoring occurred. For details of the dosage administered, see the procedural sedation documentation. David Snell tolerated the medication and procedure without complications. David Snell woke up without difficulty, and was sent home with someone to evaluate them during the post-sedation period.   Mallampati score of 2  Total amount of inservice time: 12 minutes   Patient was sedated 10 mg propofol 110 mg ketamine traction and reduction of the left ankle fracture dislocation with some improvement and then lots of soft padding and sugar-tong posterior splint applied  CRITICAL CARE TIME   N/A      CONSULTS:  None    EMERGENCY DEPARTMENT COURSE and DIFFERENTIAL DIAGNOSIS/MDM:   Vitals:    Vitals:    03/15/23 0500 03/15/23 0515 03/15/23 0530 03/15/23 0545   BP: (!) 156/102 (!) 163/99 (!) 144/102 137/87   Pulse: (!) 101 (!) 107 (!) 103 (!) 102   Resp: 15 22 19 18   Temp:       SpO2: 98% 98% 95% 94%   Weight:           Patient was given the following medications:  Medications   propofol injection 10 mg (0 mg IntraVENous Stopped 3/15/23 0436)   morphine injection 4 mg (4 mg IntraVENous Given 3/15/23 0325)   ketamine (KETALAR) injection 115 mg (115 mg IntraVENous Given 3/15/23 0435)           Is this patient to be included in the SEP-1 Core Measure due to severe sepsis or septic shock? No   Exclusion criteria - the patient is NOT to be included for SEP-1 Core Measure due to: Infection is not suspected    Postreduction film does not look a lot better there is still dislocation but considering the severity of his bimalleolar fracture this is the best I could reduce spoke with orthopedics he will be admitted to the hospitalist service he will need surgery      FINAL IMPRESSION      1. Bimalleolar ankle fracture, left, closed, initial encounter        DISPOSITION/PLAN   DISPOSITION Decision To Admit 03/15/2023 06:16:40 AM      PATIENT REFERRED TO:  No follow-up provider specified.     DISCHARGE MEDICATIONS:  New Prescriptions    No medications on file       DISCONTINUED MEDICATIONS:  Discontinued Medications    No medications on file              (Please note that portions of this note were completed with a voice recognition program.  Efforts were made to edit the dictations but occasionally words aremis-transcribed.)    Natalio Olivares Nishant Reyes MD (electronically signed)          Chantal Thomas MD  03/15/23 3209

## 2023-03-15 NOTE — PLAN OF CARE
Problem: Safety - Adult  Goal: Free from fall injury  Outcome: Progressing     Problem: Discharge Planning  Goal: Discharge to home or other facility with appropriate resources  Outcome: Progressing     Problem: Pain  Goal: Verbalizes/displays adequate comfort level or baseline comfort level  Outcome: Progressing     Problem: Musculoskeletal - Adult  Goal: Return mobility to safest level of function  Outcome: Progressing  Goal: Maintain proper alignment of affected body part  Outcome: Progressing  Goal: Return ADL status to a safe level of function  Outcome: Progressing     Problem: Metabolic/Fluid and Electrolytes - Adult  Goal: Hemodynamic stability and optimal renal function maintained  Outcome: Progressing  Goal: Electrolytes maintained within normal limits  Outcome: Progressing  Goal: Glucose maintained within prescribed range  Outcome: Progressing     Problem: Skin/Tissue Integrity - Adult  Goal: Incisions, wounds, or drain sites healing without S/S of infection  Outcome: Progressing

## 2023-03-15 NOTE — PROGRESS NOTES
Pt resting in bed with eyes closed- responds to voice. VSS- on 2L NC satting mid 90s. Dressing to LLE CDI. L pedal pulse 2+. Pt states pain is tolerable and denies nausea. Dad sent up to room.  Phase one criteria met, will transfer to [facility]

## 2023-03-15 NOTE — PROGRESS NOTES
Pt consented for procedure today at 1400. He had 240 mL water at 0920, he has been instructed on remaining NPO & is agreeable. Consent form placed on chart, PIV flushes without complication.

## 2023-03-15 NOTE — PROGRESS NOTES
Due meds given with minimal sips of water. IVF initiated through PIV without complication. Pt declines nicotine patch at this time. Lights dim and pt bundled, he would like to rest at this time.

## 2023-03-15 NOTE — ED NOTES
Pt calm and resting quietly with equal rise and fall of chest. Pt in NAD, RR even and unlabored. Side rails up, bed locked and in lowest position. Pt updated on plan of care. No needs at this time. Pt instructed on use of call light if needed.       Lillian Cabrera RN  03/15/23 8079

## 2023-03-15 NOTE — PROGRESS NOTES
Patients pre-op assessment, checklist, H&P, and problem list reviewed during patient interview prior to going to surgery. normal

## 2023-03-15 NOTE — ANESTHESIA PRE PROCEDURE
Department of Anesthesiology  Preprocedure Note       Name:  Luzma Weiss   Age:  40 y.o.  :  1980                                          MRN:  [id number]         Date:  3/15/2023      Surgeon: Mitchell Blackwood):  Michelle Perla MD    Procedure: Procedure(s):  OPEN REDUCTION INTERNAL FIXATION LEFT ANKLE-SYNTHES    Medications prior to admission:   Prior to Admission medications    Medication Sig Start Date End Date Taking? Authorizing Provider   allopurinol (ZYLOPRIM) 300 MG tablet TAKE ONE BY MOUTH DAILY 11/10/21  Yes Historical Provider, MD   colchicine (COLCRYS) 0.6 MG tablet TAKE ONE TABLET (0.6 MG TOTAL) BY MOUTH DAILY.  INDICATIONS: ACUTE GOUTY ARTHRITIS 11/10/21  Yes Historical Provider, MD   hydroxychloroquine (PLAQUENIL) 200 MG tablet TAKE ONE BY MOUTH TWICE DAILY 16  Yes Historical Provider, MD   losartan (COZAAR) 50 MG tablet Take by mouth daily 21  Yes Historical Provider, MD   PredniSONE 5 MG TBEC Take 30 mg by mouth daily 11/25/15   To Boykin MD   lisinopril (PRINIVIL;ZESTRIL) 10 MG tablet Take 10 mg by mouth 2 times daily  Patient not taking: Reported on 3/15/2023    Historical Provider, MD   amLODIPine (NORVASC) 2.5 MG tablet Take 2.5 mg by mouth daily  Patient not taking: Reported on 3/15/2023    Historical Provider, MD   ketoconazole (NIZORAL) 200 MG tablet Take 400 mg by mouth daily Patient decided to break dose up to one tablet twice a day  Patient not taking: Reported on 3/15/2023    Historical Provider, MD   potassium chloride SA (K-DUR;KLOR-CON M) 10 MEQ tablet Take 10 mEq by mouth daily  Patient not taking: Reported on 3/15/2023    Historical Provider, MD   FOLIC ACID PO Take 1 mg by mouth daily   Patient not taking: Reported on 3/15/2023    Historical Provider, MD       Current medications:    Current Facility-Administered Medications   Medication Dose Route Frequency Provider Last Rate Last Admin    sodium chloride flush 0.9 % injection 5-40 mL  5-40 mL IntraVENous 2 times per day Macey Amezquita MD   5 mL at 03/15/23 1006    sodium chloride flush 0.9 % injection 5-40 mL  5-40 mL IntraVENous PRN Macey Amezquita MD        0.9 % sodium chloride infusion   IntraVENous PRN Macey Amezquita MD        polyethylene glycol (GLYCOLAX) packet 17 g  17 g Oral Daily PRN Macey Amezquita MD        acetaminophen (TYLENOL) tablet 650 mg  650 mg Oral Q6H PRN Macey Amezquita MD        Or   Ty Cameron acetaminophen (TYLENOL) suppository 650 mg  650 mg Rectal Q6H PRN Macey Amezquita MD        ketamine (KETALAR) injection 115 mg  1 mg/kg IntraVENous Once Mcaey Amezquita MD        oxyCODONE (ROXICODONE) immediate release tablet 5 mg  5 mg Oral Q4H PRN Macey Amezquita MD        Or    oxyCODONE (ROXICODONE) immediate release tablet 10 mg  10 mg Oral Q4H PRN Macey Amezquita MD   10 mg at 03/15/23 1313    HYDROmorphone HCl PF (DILAUDID) injection 0.5 mg  0.5 mg IntraVENous Q4H PRN Macey Amezquita MD        Or    HYDROmorphone HCl PF (DILAUDID) injection 1 mg  1 mg IntraVENous Q4H PRN Macey Amezquita MD   1 mg at 03/15/23 1108    losartan (COZAAR) tablet 50 mg  50 mg Oral Daily Macey Amezquita MD   50 mg at 03/15/23 0959    predniSONE (DELTASONE) tablet 5 mg  5 mg Oral Daily Macey Amezquita MD   5 mg at 03/15/23 1000    allopurinol (ZYLOPRIM) tablet 300 mg  300 mg Oral Daily Macey Amezquita MD   300 mg at 03/15/23 1000    [Held by provider] enoxaparin (LOVENOX) injection 40 mg  40 mg SubCUTAneous Daily Macey Amezquita MD        nicotine (NICODERM CQ) 14 MG/24HR 1 patch  1 patch TransDERmal Daily HARPREET Pro - CNP        0.9 % sodium chloride infusion   IntraVENous Continuous HARPREET Allan -  mL/hr at 03/15/23 1004 New Bag at 03/15/23 1004       Allergies:     Allergies   Allergen Reactions    Vitamin D Other (See Comments)     Due to hypercalcemia per Dr Brianna Payment    Aspartame And Phenylalanine     Aspirin     Magnesium Rash       Problem List:    Patient Active Problem List Diagnosis Code    [facility]rombocytopenia ([facility] D69.6    Ankle fracture, left, closed, initial encounter S82.892A    HTN (hypertension) I10    CKD (chronic kidney disease) N18.9    Sarcoid D86.9       Past Medical History:        Diagnosis Date    Chronic kidney disease     Hypercalcemia     Sarcoidosis        Past Surgical History:        Procedure Laterality Date    NECK SURGERY      ORTHOPEDIC SURGERY         Social History:    Social History     Tobacco Use    Smoking status: Every Day     Packs/day: 2.00     Years: 15.00     Pack years: 30.00     Types: Cigarettes    Smokeless tobacco: Not on file   Substance Use Topics    Alcohol use: Yes     Comment: last drink maybe one week ago (11/17/15)                                Ready to quit: Not Answered  Counseling given: Not Answered      Vital Signs (Current):   Vitals:    03/15/23 1040 03/15/23 1107 03/15/23 1138 03/15/23 1343   BP:  134/88     Pulse:  87     Resp:  18 18 18   Temp:  97 °F (36.1 °C)     TempSrc:  Temporal     SpO2:  95%     Weight: 263 lb 14.3 oz (119.7 kg)      Height: 6' 1\" (1.854 m)                                                 BP Readings from Last 3 Encounters:   03/15/23 134/88   11/25/15 123/77   10/19/15 133/71       NPO Status:                                                                                 BMI:   Wt Readings from Last 3 Encounters:   03/15/23 263 lb 14.3 oz (119.7 kg)   11/24/15 222 lb 7.1 oz (100.9 kg)   04/03/15 223 lb 3.2 oz (101.2 kg)     Body mass index is 34.82 kg/m².     CBC:   Lab Results   Component Value Date/Time    WBC 6.9 03/15/2023 03:31 AM    RBC 5.04 03/15/2023 03:31 AM    RBC 5.28 04/03/2015 12:36 PM    HGB 15.4 03/15/2023 03:31 AM    HCT 45.1 03/15/2023 03:31 AM    MCV 89.5 03/15/2023 03:31 AM    RDW 13.7 03/15/2023 03:31 AM     03/15/2023 03:31 AM       CMP:   Lab Results   Component Value Date/Time     03/15/2023 03:31 AM    K 3.8 03/15/2023 03:31 AM     03/15/2023 03:31 AM    CO2 24 03/15/2023 03:31 AM    BUN 20 03/15/2023 03:31 AM    CREATININE 1.6 03/15/2023 03:31 AM    GFRAA 52 11/25/2015 04:33 AM    AGRATIO 1.4 03/15/2023 03:31 AM    LABGLOM 55 03/15/2023 03:31 AM    GLUCOSE 152 03/15/2023 03:31 AM    GLUCOSE 105 04/03/2015 12:36 PM    PROT 6.6 03/15/2023 03:31 AM    PROT 7.5 04/03/2015 12:36 PM    CALCIUM 8.9 03/15/2023 03:31 AM    BILITOT 0.5 03/15/2023 03:31 AM    ALKPHOS 132 03/15/2023 03:31 AM    AST 79 03/15/2023 03:31 AM    ALT 60 03/15/2023 03:31 AM       POC Tests: No results for input(s): POCGLU, POCNA, POCK, POCCL, POCBUN, POCHEMO, POCHCT in the last 72 hours. Coags: No results found for: PROTIME, INR, APTT    HCG (If Applicable): No results found for: PREGTESTUR, PREGSERUM, HCG, HCGQUANT     ABGs: No results found for: PHART, PO2ART, CAX2DUX, YCP0XRA, BEART, N6RERTOH     Type & Screen (If Applicable):  No results found for: LABABO, LABRH    Drug/Infectious Status (If Applicable):  No results found for: HIV, HEPCAB    COVID-19 Screening (If Applicable): No results found for: COVID19        Anesthesia Evaluation  Patient summary reviewed and Nursing notes reviewed no history of anesthetic complications:   Airway: Mallampati: III  TM distance: <3 FB   Neck ROM: full  Mouth opening: > = 3 FB   Dental: normal exam         Pulmonary:normal exam  breath sounds clear to auscultation  (+) shortness of breath (mild, on chronic low dose pred):  current smoker    (-) COPD, asthma and sleep apnea                          ROS comment: Sarcoidosis: Clinical diagnosis with compatible CT chest findings + hypercalcemia in the past. Currently, he has no significant symptoms other than mild PFEIFFER that may be from other causes. He states he never had any specific symptoms but treatment has been based on imaging and lab trend.      Cardiovascular:    (+) hypertension:,     (-) past MI, CAD, CABG/stent, dysrhythmias,  angina and  CHF    ECG reviewed  Rhythm: regular  Rate: normal Neuro/Psych:   Negative Neuro/Psych ROS     (-) seizures, TIA and CVA           GI/Hepatic/Renal:   (+) renal disease: CRI,      (-) GERD and liver disease       Endo/Other:    (+) : arthritis (Gout):., .    (-) diabetes mellitus, hypothyroidism, hyperthyroidism               Abdominal:   (+) obese,           Vascular: Other Findings:           Anesthesia Plan      general     ASA 2       Induction: intravenous. MIPS: Postoperative opioids intended and Prophylactic antiemetics administered. Anesthetic plan and risks discussed with patient. Plan discussed with CRNA.                     Gregorio Cole MD   3/15/2023

## 2023-03-15 NOTE — CONSULTS
[facility] Orthopedic Surgery  Consult Note    Patient: Katy Emerson  Admit Date: 3/15/2023  Requesting Physician: Gissell Campbell MD  Room: [place] 308675/8906-24    Chief complaint: LEFT ankle fracture    HPI: Katy Emerson is a 40 y.o. male who presented to [facility] ER yesterday after he slipped off a stepladder landing on his left leg. Unable to ambulate afterwards. Describes pain in the left ankle of moderate to severe intensity and of sharp nature since the fall which is relieved by narcotics partially. Denies new numbness/tingling. Independent imaging review of the left ankle via plain films demonstrated: displaced bimalleolar ankle fracture with subluxation/dislocation. Post- reduction films are stable with persistent tibio talar dislocation. Patient lives in private apt with 6 steps to enter and uses no assistive devices at baseline to ambulate. Smokes up to half pk/day. He works as . This did not happen at work. Relevant notes, labs and other tests reviewed. Medical History:  Past Medical History:   Diagnosis Date    Chronic kidney disease     Hypercalcemia     Sarcoidosis      Past Surgical History:   Procedure Laterality Date    NECK SURGERY      ORTHOPEDIC SURGERY         Social History:    reports that he has been smoking cigarettes. He has a 30.00 pack-year smoking history. He does not have any smokeless tobacco history on file.     Family History:        Problem Relation Age of Onset    Cancer Mother         breast    Other Father         sarcoidosis    Cancer Father         prostate       Medications:  ALL MEDICATIONS HAVE BEEN REVIEWED:  Scheduled:   sodium chloride flush  5-40 mL IntraVENous 2 times per day    ketamine  1 mg/kg IntraVENous Once    losartan  50 mg Oral Daily    predniSONE  5 mg Oral Daily    allopurinol  300 mg Oral Daily    [START ON 3/16/2023] enoxaparin  40 mg SubCUTAneous Daily     Continuous:   sodium chloride       PRN:sodium chloride flush, sodium chloride, polyethylene glycol, acetaminophen **OR** acetaminophen, oxyCODONE **OR** oxyCODONE, HYDROmorphone **OR** HYDROmorphone    Allergies: Allergies   Allergen Reactions    Vitamin D Other (See Comments)     Due to hypercalcemia per Dr Trupti Yung    Aspartame And Phenylalanine     Aspirin     Magnesium Rash       Review of Systems:  Constitutional: Negative for fever, chills, fatigue. Skin:  Negative for pruritis, rash  Eyes: Negative for photophobia and visual disturbance. ENT:  Negative for rhinorrhea, epistaxis, sore throat  Respiratory:  Negative for cough and shortness of breath. Cardiovascular: Negative for chest pain. Gastrointestinal: Negative for nausea, vomiting, diarrhea. Genitourinary: Negative for dysuria and difficulty urinating. Neurological: Negative for confusion, dysarthria, tremors, seizures. Psychiatric:  Negative for depression or anxiety  Musculoskeletal:  Positive for left ankle pain    Objective:  Vitals:    03/15/23 0905   BP:    Pulse:    Resp: 24   Temp:    SpO2:       Physical Examination:  GENERAL: No apparent distress, well-nourished  SKIN:  Warm and dry  EYES: Nonicteric. ENT: Mucous membranes moist  HEAD: Normocephalic, atraumatic  RESPIRATORY: Resp easy and unlabored  CARDIOVASCULAR: Regular rate and rhythm  GI: Abdomen soft, nontender  NEURO: Awake and alert. No speech defect  PSYCHIATRIC: Appropriate affect; not agitated  MUSCULOSKELETAL:  LEFT ankle  Inspection: On exam there are no ulcerations, rashes or lesions about the left ankle. There is pain to palpation of the left ankle. Motor:Can wiggle toes left foot; no DF/PF given splint and fracture  Sensation: Grossly intact to light touch throughout the left lower extremity in all nerve distributions. Vascular:  brisk cap refill in all toes.     Labs reviewed:  Recent Labs     03/15/23  0331   WBC 6.9   HGB 15.4   HCT 45.1        Recent Labs     03/15/23  0331      K 3.8    CO2 24   BUN 20   CREATININE 1.6*   GLUCOSE 152*   CALCIUM 8.9     No results for input(s): INR, PROTIME in the last 72 hours. Lab Results   Component Value Date    COLORU YELLOW 11/24/2015    CLARITYU Clear 11/24/2015    PHUR 7.0 11/24/2015    GLUCOSEU 250 (A) 11/24/2015    BLOODU Negative 11/24/2015    LEUKOCYTESUR Negative 11/24/2015    BILIRUBINUR Negative 11/24/2015    UROBILINOGEN 0.2 11/24/2015       Imaging:  XR ANKLE LEFT (MIN 3 VIEWS)   Final Result   Unchanged alignment status post reduction and casting. XR ANKLE LEFT (MIN 3 VIEWS)   Final Result   Acute bimalleolar ankle fracture/dislocation. CT Head W/O Contrast   Final Result   No acute intracranial abnormality. XR TIBIA FIBULA LEFT (2 VIEWS)   Final Result   Acute bimalleolar ankle fracture/dislocation. IMPRESSION:  LEFT displaced bimalleolar ankle fracture  Principal Problem:    Ankle fracture, left, closed, initial encounter  Active Problems:    HTN (hypertension)    CKD (chronic kidney disease)    Sarcoid  Resolved Problems:    * No resolved hospital problems. *      RECOMMENDATIONS:  We discussed both operative and non-operative treatments with our recommendation being for operative fixation. After discussion of risks and benefits, the patient agreed to proceed with surgery. - Schedule for LEFT ankle open reduction and internal fixation today Dr. Danie Mayer today  - NPO  - NWB LLE; continue splint  - Pain control  - DVT prophylaxis: Hold anti-coags today. - Appreciate pre-op clearance from internal medicine  - Verify surgical consent  - Pre op orders placed   - Dispo: likely home tomorrow after seen by therapy    I have reviewed imaging and plan with Dr. Octavio Manzanares. Case discussed with hospitalist, Janyce Seip and RN.     Wojciech Aguilar, HARPREET - CNP  3/15/2023  9:07 AM

## 2023-03-15 NOTE — ED NOTES
Report given to ICU RN. Questions answered, care transferred. Pt off unit via stretcher in NAD, RR even and unlabored.       Aj Zimmerman RN  03/15/23 2597
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
## 2023-03-15 NOTE — ED NOTES
Consent signed for procedural/conscious sedation and reduction of left ankle.         Debi Costa RN  03/15/23 8883

## 2023-03-15 NOTE — PROGRESS NOTES
Pt to PACU from OR, arouses to voice. VSS- on RA satting low 90s. NSR to sinus tach on monitor. Dressing to LLE CDI, ice applied with extremity elevated. L pedal pulse palpable with cap refill < 3 seconds. Xray at bedside.  Will continue to monitor

## 2023-03-15 NOTE — PROGRESS NOTES
RT on standby for conscious sedation. Patient etco2 and O2 saturations remained within normal  limits. Suction and emergency equipment set up and on stand by.

## 2023-03-15 NOTE — H&P
Hospital Medicine History & Physical      PCP: Naila Ureña MD    Date of Admission: 3/15/2023    Date of Service: Pt seen/examined on 03/15/23 and Admitted to Inpatient with expected LOS greater than two midnights due to medical therapy. Chief Complaint: Left ankle pain       History Of Present Illness:      Gordon Amezquita is 40 y.o. male with history of HTN, sarcoid disease, CKD and gout   He works as an . For his sarcoid and CKD he goes to   He now presents to the ER with complaint of left ankle pain after he had a fall  He was going up the ladder to remove some bushes on his driveway  He missed a step and fell   Pain is constant, severe, 9/10, worse with movement and better with pain med  Xray shows acute bimalleolar ankle fracture      Past Medical History:          Diagnosis Date    Chronic kidney disease     Hypercalcemia     Sarcoidosis        Past Surgical History:          Procedure Laterality Date    NECK SURGERY      ORTHOPEDIC SURGERY         Medications Prior to Admission:      Prior to Admission medications    Medication Sig Start Date End Date Taking? Authorizing Provider   allopurinol (ZYLOPRIM) 300 MG tablet TAKE ONE BY MOUTH DAILY 11/10/21  Yes Historical Provider, MD   colchicine (COLCRYS) 0.6 MG tablet TAKE ONE TABLET (0.6 MG TOTAL) BY MOUTH DAILY.  INDICATIONS: ACUTE GOUTY ARTHRITIS 11/10/21  Yes Historical Provider, MD   hydroxychloroquine (PLAQUENIL) 200 MG tablet TAKE ONE BY MOUTH TWICE DAILY 11/7/16  Yes Historical Provider, MD   losartan (COZAAR) 50 MG tablet Take by mouth daily 8/20/21  Yes Historical Provider, MD   PredniSONE 5 MG TBEC Take 30 mg by mouth daily 11/25/15   Fela Lewis MD   lisinopril (PRINIVIL;ZESTRIL) 10 MG tablet Take 10 mg by mouth 2 times daily    Historical Provider, MD   amLODIPine (NORVASC) 2.5 MG tablet Take 2.5 mg by mouth daily    Historical Provider, MD   ketoconazole (NIZORAL) 200 MG tablet Take 400 mg by mouth daily Patient decided to break dose up to one tablet twice a day    Historical Provider, MD   potassium chloride SA (K-DUR;KLOR-CON M) 10 MEQ tablet Take 10 mEq by mouth daily    Historical Provider, MD   FOLIC ACID PO Take 1 mg by mouth daily     Historical Provider, MD       Allergies:  Vitamin d, Aspartame and phenylalanine, Aspirin, and Magnesium    Social History:      The patient currently lives at home    TOBACCO:   reports that he has been smoking cigarettes. He has a 30.00 pack-year smoking history. He does not have any smokeless tobacco history on file. ETOH:   reports current alcohol use. E-cigarette/Vaping       Questions Responses    E-cigarette/Vaping Use     Start Date     Passive Exposure     Quit Date     Counseling Given     Comments               Family History:      Reviewed and negative in regards to presenting illness/complaint. Problem Relation Age of Onset    Cancer Mother         breast    Other Father         sarcoidosis    Cancer Father         prostate       REVIEW OF SYSTEMS COMPLETED:   Pertinent positives as noted in the HPI. All other systems reviewed and negative. PHYSICAL EXAM PERFORMED:    BP (!) 134/98   Pulse 98   Temp 97.5 °F (36.4 °C)   Resp 17   Wt 250 lb (113.4 kg)   SpO2 92%   BMI 32.98 kg/m²     General appearance:  No apparent distress, appears stated age and cooperative. HEENT:  Normal cephalic, atraumatic without obvious deformity. Pupils equal, round, and reactive to light. Extra ocular muscles intact. Conjunctivae/corneas clear. Neck: Supple, with full range of motion. No jugular venous distention. Trachea midline. Respiratory:  Normal respiratory effort. Clear to auscultation, bilaterally without Rales/Wheezes/Rhonchi. Cardiovascular:  Regular rate and rhythm with normal S1/S2 without murmurs, rubs or gallops. Abdomen: Soft, non-tender, non-distended with normal bowel sounds. Musculoskeletal:  No clubbing, cyanosis or edema bilaterally.   Full range of motion without deformity. Skin: Skin color, texture, turgor normal.  No rashes or lesions. Neurologic:  Neurovascularly intact without any focal sensory/motor deficits. Cranial nerves: II-XII intact, grossly non-focal.  Psychiatric:  Alert and oriented, thought content appropriate, normal insight  Capillary Refill: Brisk,3 seconds, normal  Peripheral Pulses: +2 palpable, equal bilaterally       Labs:     Recent Labs     03/15/23  0331   WBC 6.9   HGB 15.4   HCT 45.1        Recent Labs     03/15/23  0331      K 3.8      CO2 24   BUN 20   CREATININE 1.6*   CALCIUM 8.9     Recent Labs     03/15/23  0331   AST 79*   ALT 60*   BILITOT 0.5   ALKPHOS 132*     No results for input(s): INR in the last 72 hours. No results for input(s): Dominguez Acevedo in the last 72 hours. Urinalysis:      Lab Results   Component Value Date/Time    NITRU Negative 11/24/2015 07:00 PM    BLOODU Negative 11/24/2015 07:00 PM    SPECGRAV 1.012 11/24/2015 07:00 PM    GLUCOSEU 250 11/24/2015 07:00 PM       Radiology:     XR ANKLE LEFT (MIN 3 VIEWS)   Final Result   Unchanged alignment status post reduction and casting. XR ANKLE LEFT (MIN 3 VIEWS)   Final Result   Acute bimalleolar ankle fracture/dislocation. CT Head W/O Contrast   Final Result   No acute intracranial abnormality. XR TIBIA FIBULA LEFT (2 VIEWS)   Final Result   Acute bimalleolar ankle fracture/dislocation.              Consults:    None    ASSESSMENT:    Acute ankle fracture, left, closed, initial encounter  Mechanical fall  Sarcoid disease   CKD   HTN  Gout     PLAN:    Admit to [facility] orthopedic surgery  IV Dilaudid and PO oxycodone for pain control  Continue losartan for hypertension and prednisone 5 mg daily for sarcoid disease      DVT Prophylaxis: Lovenox  Diet: Diet NPO Exceptions are: Sips of Water with Meds  Code Status: Full Code    PT/OT Eval Status: PT/OT consult is not ordered    Dispo -admit as inpatient       Yamila Mcgrath MD    Thank you Nicola Aaron MD for the opportunity to be involved in this patient's care. If you have any questions or concerns please feel free to contact me at [phone].

## 2023-03-16 ENCOUNTER — APPOINTMENT (OUTPATIENT)
Dept: CT IMAGING | Age: 43
DRG: 494 | End: 2023-03-16
Payer: COMMERCIAL

## 2023-03-16 VITALS
SYSTOLIC BLOOD PRESSURE: 131 MMHG | WEIGHT: 264.2 LBS | HEART RATE: 92 BPM | BODY MASS INDEX: 35.02 KG/M2 | RESPIRATION RATE: 16 BRPM | TEMPERATURE: 98.3 F | DIASTOLIC BLOOD PRESSURE: 87 MMHG | OXYGEN SATURATION: 97 % | HEIGHT: 73 IN

## 2023-03-16 LAB
ANION GAP SERPL CALCULATED.3IONS-SCNC: 8 MMOL/L (ref 3–16)
BUN SERPL-MCNC: 30 MG/DL (ref 7–20)
CALCIUM SERPL-MCNC: 8.7 MG/DL (ref 8.3–10.6)
CHLORIDE SERPL-SCNC: 106 MMOL/L (ref 99–110)
CO2 SERPL-SCNC: 24 MMOL/L (ref 21–32)
CREAT SERPL-MCNC: 1.5 MG/DL (ref 0.9–1.3)
GFR SERPLBLD CREATININE-BSD FMLA CKD-EPI: 59 ML/MIN/{1.73_M2}
GLUCOSE SERPL-MCNC: 128 MG/DL (ref 70–99)
POTASSIUM SERPL-SCNC: 5.3 MMOL/L (ref 3.5–5.1)
SODIUM SERPL-SCNC: 138 MMOL/L (ref 136–145)

## 2023-03-16 PROCEDURE — 6360000002 HC RX W HCPCS: Performed by: NURSE PRACTITIONER

## 2023-03-16 PROCEDURE — 80048 BASIC METABOLIC PNL TOTAL CA: CPT

## 2023-03-16 PROCEDURE — 97116 GAIT TRAINING THERAPY: CPT

## 2023-03-16 PROCEDURE — 36415 COLL VENOUS BLD VENIPUNCTURE: CPT

## 2023-03-16 PROCEDURE — 97530 THERAPEUTIC ACTIVITIES: CPT

## 2023-03-16 PROCEDURE — 6370000000 HC RX 637 (ALT 250 FOR IP): Performed by: NURSE PRACTITIONER

## 2023-03-16 PROCEDURE — 97535 SELF CARE MNGMENT TRAINING: CPT

## 2023-03-16 PROCEDURE — 73700 CT LOWER EXTREMITY W/O DYE: CPT

## 2023-03-16 PROCEDURE — 97161 PT EVAL LOW COMPLEX 20 MIN: CPT

## 2023-03-16 PROCEDURE — 99024 POSTOP FOLLOW-UP VISIT: CPT | Performed by: NURSE PRACTITIONER

## 2023-03-16 PROCEDURE — APPNB45 APP NON BILLABLE 31-45 MINUTES: Performed by: NURSE PRACTITIONER

## 2023-03-16 PROCEDURE — 97165 OT EVAL LOW COMPLEX 30 MIN: CPT

## 2023-03-16 RX ORDER — NICOTINE 21 MG/24HR
1 PATCH, TRANSDERMAL 24 HOURS TRANSDERMAL DAILY
Qty: 30 PATCH | Refills: 3 | Status: SHIPPED | OUTPATIENT
Start: 2023-03-17

## 2023-03-16 RX ORDER — OXYCODONE HYDROCHLORIDE 5 MG/1
5 TABLET ORAL EVERY 4 HOURS PRN
Qty: 42 TABLET | Refills: 0 | Status: SHIPPED | OUTPATIENT
Start: 2023-03-16 | End: 2023-03-23

## 2023-03-16 RX ADMIN — PREDNISONE 5 MG: 5 TABLET ORAL at 08:48

## 2023-03-16 RX ADMIN — LOSARTAN POTASSIUM 50 MG: 25 TABLET, FILM COATED ORAL at 08:49

## 2023-03-16 RX ADMIN — OXYCODONE 10 MG: 5 TABLET ORAL at 13:31

## 2023-03-16 RX ADMIN — HYDROMORPHONE HYDROCHLORIDE 1 MG: 1 INJECTION, SOLUTION INTRAMUSCULAR; INTRAVENOUS; SUBCUTANEOUS at 01:13

## 2023-03-16 RX ADMIN — OXYCODONE 10 MG: 5 TABLET ORAL at 04:34

## 2023-03-16 RX ADMIN — HYDROMORPHONE HYDROCHLORIDE 1 MG: 1 INJECTION, SOLUTION INTRAMUSCULAR; INTRAVENOUS; SUBCUTANEOUS at 08:45

## 2023-03-16 RX ADMIN — ENOXAPARIN SODIUM 40 MG: 100 INJECTION SUBCUTANEOUS at 08:48

## 2023-03-16 RX ADMIN — ALLOPURINOL 300 MG: 300 TABLET ORAL at 08:48

## 2023-03-16 ASSESSMENT — PAIN DESCRIPTION - DESCRIPTORS
DESCRIPTORS: THROBBING
DESCRIPTORS: ACHING

## 2023-03-16 ASSESSMENT — PAIN DESCRIPTION - ONSET: ONSET: GRADUAL

## 2023-03-16 ASSESSMENT — PAIN DESCRIPTION - ORIENTATION
ORIENTATION: LEFT
ORIENTATION: LEFT

## 2023-03-16 ASSESSMENT — PAIN SCALES - GENERAL
PAINLEVEL_OUTOF10: 7
PAINLEVEL_OUTOF10: 3
PAINLEVEL_OUTOF10: 7
PAINLEVEL_OUTOF10: 7
PAINLEVEL_OUTOF10: 3
PAINLEVEL_OUTOF10: 5
PAINLEVEL_OUTOF10: 3

## 2023-03-16 ASSESSMENT — PAIN - FUNCTIONAL ASSESSMENT
PAIN_FUNCTIONAL_ASSESSMENT: ACTIVITIES ARE NOT PREVENTED
PAIN_FUNCTIONAL_ASSESSMENT: ACTIVITIES ARE NOT PREVENTED

## 2023-03-16 ASSESSMENT — PAIN DESCRIPTION - PAIN TYPE: TYPE: SURGICAL PAIN

## 2023-03-16 ASSESSMENT — PAIN DESCRIPTION - LOCATION
LOCATION: ANKLE
LOCATION: ANKLE

## 2023-03-16 ASSESSMENT — PAIN DESCRIPTION - FREQUENCY: FREQUENCY: INTERMITTENT

## 2023-03-16 NOTE — PROGRESS NOTES
[facility] Orthopedic Surgery   Progress Note    CHIEF COMPLAINT/DIAGNOSIS: S/p left ankle ORIF     SUBJECTIVE: The patient is sitting up in the bed with mother at bedside. Pain controlled on current regimen. Ambulated well with a walker. He has walker at home. Needs a work note. Drinks 24pk/week he reports - open to nicotine patches at home. Only follows with pulmonology - not nephrology. OBJECTIVE  Physical    VITALS:  /87   Pulse 92   Temp 98.3 °F (36.8 °C) (Oral)   Resp 18   Ht 6' 1\" (1.854 m)   Wt 264 lb 3.2 oz (119.8 kg)   SpO2 97%   BMI 34.86 kg/m²     GENERAL: Alert and oriented x3, in no acute distress. MUSCULOSKELETAL: Able to wiggle all toes. INCISION:  Covered with post-op splint which is well-fitted, c/d/I. Sensory:  Intact to light touch in all toes   Vascular:   brisk cap refill in toes. Data    ALL MEDICATIONS HAVE BEEN REVIEWED    CBC:   Recent Labs     03/15/23  0331   WBC 6.9   HGB 15.4   HCT 45.1        BMP:   Recent Labs     03/15/23  0331 03/16/23  0440    138   K 3.8 5.3*    106   CO2 24 24   BUN 20 30*   CREATININE 1.6* 1.5*     INR: No results for input(s): INR in the last 72 hours. There is a new oblique fracture line in distal tibia since intra-op films. ASSESSMENT:  S/p left ankle ORIF (3/15/23), POD#1  Sarcoidosis on chronic steroids  CKD  Tobacco use  Heavy alcohol use    PLAN:   STAT CT of the left ankle now  - WB status:  NO weight bearing through operative ankle; Continue splint. Reviewed post op precautions  - DVT prophylaxis: per primary team   - PT/OT  - Pain Control: Oxy Rx sent to pharmacy. Due to orthopaedic surgical procedure/condition, patient may require pain medication for up to 6-8 weeks. - Cannot due vit D due to hypercalcemia  - Dispo: await CT results. Follow-up with Dr. Berle Eisenmenger in 2 weeks. Office # [phone]  No future appointments.     HARPREET Solorio - CNP  3/16/2023  9:19 AM    Addendum: CT reviewed with patient. New anterior intra-articular distal tibia and posterior mal fx since surgery. Likely happened while he was thrashing around waking up from anesthesia (required multiple staff members to restrain/calm him down reportedly).   He can d/c home today with parents and return to [facility] for surgery on Monday morning, 3/20 with Dr. Cindy Alexander

## 2023-03-16 NOTE — CARE COORDINATION
Discharge note:      CM/RASHDIA has been notified of discharge. Patient noted to have the following needs at discharge. CM/RASHIDA has coordinated the following services: [facility] (Orthopedic) Home Health  [address] [facility] [address] [facility] [address], [address] [facility]  Phone: [phone]  Fax: [phone]           Discharge Destination: Home   Transportation: Family         All CM/SW needs met, will sign off.      Electronically signed by VERA Vang on 3/16/2023 at 3:10 PM

## 2023-03-16 NOTE — PROGRESS NOTES
CLINICAL PHARMACY NOTE: MEDS TO BEDS    Total # of Prescriptions Filled: 2   The following medications were delivered to the patient:  Current Discharge Medication List        START taking these medications    Details   nicotine (NICODERM CQ) 14 MG/24HR Place 1 patch onto the skin daily  Qty: 30 patch, Refills: 3      oxyCODONE (ROXICODONE) 5 MG immediate release tablet Take 1 tablet by mouth every 4 hours as needed for Pain for up to 7 days. Intended supply: 7 days.  Take lowest dose possible to manage pain Max Daily Amount: 30 mg  Qty: 42 tablet, Refills: 0    Comments: Reduce doses taken as pain becomes manageable  Associated Diagnoses: Bimalleolar ankle fracture, left, closed, initial encounter               Additional Documentation:   Pt signed   Brown Sep ok deliver

## 2023-03-16 NOTE — DISCHARGE INSTR - COC
Continuity of Care Form    Patient Name: Collin Nolasco   :  1980  MRN:  [id number]    Admit date:  3/15/2023  Discharge date:  ***    Code Status Order: Full Code   Advance Directives:   [facility] Documentation       Date/Time Healthcare Directive Type of Healthcare Directive Copy in [facility] [address] Agent's Name Healthcare Agent's Phone Number    03/15/23 1414 No, patient does not have an advance directive for healthcare treatment -- -- -- -- --    03/15/23 2904 No, patient does not have an advance directive for healthcare treatment -- -- -- -- --            Admitting Physician:  Meena Ibrahim MD  PCP: Abhinav Ga MD    Discharging Nurse: [facility] Unit/Room#: 5KV-0054/7993-29  Discharging Unit Phone Number: ***    Emergency Contact:   Extended Emergency Contact Information  Primary Emergency Contact: Clarita Douglass  Address: [address]  Home Phone: [phone]  Relation: Parent  Secondary Emergency Contact: None,To List  Relation: Other    Past Surgical History:  Past Surgical History:   Procedure Laterality Date    NECK SURGERY      ORTHOPEDIC SURGERY         Immunization History:   Immunization History   Administered Date(s) Administered    Influenza Virus Vaccine 2015       Active Problems:  Patient Active Problem List   Diagnosis Code    Thrombocytopenia ([facility] [address] D69.6    Ankle fracture, left, closed, initial encounter S82.892A    HTN (hypertension) I10    CKD (chronic kidney disease) N18.9    Sarcoid D86.9       Isolation/Infection:   Isolation            No Isolation          Patient Infection Status       None to display            Nurse Assessment:  Last Vital Signs: /87   Pulse 92   Temp 98.3 °F (36.8 °C) (Oral)   Resp 16   Ht 6' 1\" (1.854 m)   Wt 264 lb 3.2 oz (119.8 kg)   SpO2 97%   BMI 34.86 kg/m²     Last documented pain score (0-10 scale): Pain Level: 3  Last Weight:   Wt Readings from Last 1 Encounters:   23 264 lb 3.2 oz (119.8 kg)     Mental Status:  {IP PT MENTAL STATUS:}    IV Access:  { CRISTINA IV ACCESS:860249114}    Nursing Mobility/ADLs:  Walking   {CHP DME IIDT:798924353}  Transfer  {CHP DME TQJF:017246502}  Bathing  {CHP DME LLDL:473180298}  Dressing  {CHP DME IUZQ:781097599}  Toileting  {CHP DME BINE:342431001}  Feeding  {CHP DME QGKW:175698584}  Med Admin  {CHP DME ZDL}  Med Delivery   { CRISTINA MED Delivery:032867027}    Wound Care Documentation and Therapy:  Incision 03/15/23 Ankle Left; Outer; Inner (Active)   Dressing Status Clean;Dry; Intact 23 1230   Dressing/Treatment ABD pad; Ace wrap;Dry dressing;Splint;Xeroform; Other (comment) 23 1230   Closure Sutures 23 1230   Drainage Amount None 23 1230   Number of days: 0        Elimination:  Continence: Bowel: {YES / KV:55221}  Bladder: {YES / N}  Urinary Catheter: {Urinary Catheter:958542658}   Colostomy/Ileostomy/Ileal Conduit: {YES / GV:93014}       Date of Last BM: ***    Intake/Output Summary (Last 24 hours) at 3/16/2023 1511  Last data filed at 3/16/2023 0400  Gross per 24 hour   Intake 900 ml   Output 810 ml   Net 90 ml     I/O last 3 completed shifts: In: 7814 [P.O.:270;  I.V.:900]  Out: 1310 [Urine:1300; Blood:10]    Safety Concerns:     [address] [employer] Safety Concerns:209281252}    Impairments/Disabilities:      [address] [employer] Impairments/Disabilities:705456446}    Nutrition Therapy:  Current Nutrition Therapy:   [address] [employer] Diet List:590477637}    Routes of Feeding: {CHP DME Other Feedings:633780421}  Liquids: {Slp liquid thickness:37138}  Daily Fluid Restriction: {CHP DME Yes amt example:981460754}  Last Modified Barium Swallow with Video (Video Swallowing Test): {Done Not Done [facility]:258837829}    Treatments at the Time of Hospital Discharge:   Respiratory Treatments: ***  Oxygen Therapy:  {Therapy; copd oxygen:99143}  Ventilator:    {MH CC Vent YXMZ:505277451}    Rehab Therapies: {THERAPEUTIC INTERVENTION:3342311118}  Weight Bearing Status/Restrictions: 50Ghulam Camargo CC Weight Bearin}  Other Medical Equipment (for information only, NOT a DME order):  {EQUIPMENT:027946986}  Other Treatments: ***    Patient's personal belongings (please select all that are sent with patient):  {CHP DME Belongings:750336264}    RN SIGNATURE:  {Esignature:946992905}    CASE MANAGEMENT/SOCIAL WORK SECTION    Inpatient Status Date: 3/15/2023    Readmission Risk Assessment Score:  Readmission Risk              Risk of Unplanned Readmission:  10           Discharging to Facility/ [address] [place] [address] (Orthopedic) [address]  Phone: [phone]  Fax: [phone]      / signature: {Esignature:842789918}    PHYSICIAN SECTION    Prognosis: {Prognosis:8792789803}    Condition at Discharge: 50Ghulam Camargo Patient Condition:19813}    Rehab Potential (if transferring to Rehab): {Prognosis:4028071091}    Recommended Labs or Other Treatments After Discharge: ***    Physician Certification: I certify the above information and transfer of Jessica Camacho  is necessary for the continuing treatment of the diagnosis listed and that he requires {Admit to Appropriate Level of Care:31166} for {GREATER/LESS:557320352} 30 days.      Update Admission H&P: {CHP DME Changes in RGXKA:469886598}    PHYSICIAN SIGNATURE:  {Esignature:097689626}

## 2023-03-16 NOTE — DISCHARGE INSTRUCTIONS
Nothing to eat or drink after midnight Sunday night/Monday morning. Dr. Dianne Vyas's team will call you today or tomorrow to give you the final surgery time and when to arrive at [facility] (Monday, 3/20)  His office # is [phone]  No weight bearing on left ankle  Keep splint in place. Stop smoking  Elevate leg often  Surgery on Monday will be outpatient where you can go home the same-day. Keep dressing clean and dry  Notify your surgeon for numbness or tingling in the leg. Change in color of skin tone in toes.
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
## 2023-03-16 NOTE — PROGRESS NOTES
Shift assessment complete, VSS, A&Ox4. Morning medications administered per MAR. Patient states pain is 7/10, pain medication administered per MAR. PT/OT at bedside to work with patient. Patient states no further needs at this time. Call light and personal belongings within reach.

## 2023-03-16 NOTE — PLAN OF CARE
Problem: Safety - Adult  Goal: Free from fall injury  3/16/2023 1607 by Анна Hendrickson RN  Outcome: Completed  3/16/2023 0901 by Ramiro Pena RN  Outcome: Progressing     Problem: Discharge Planning  Goal: Discharge to home or other facility with appropriate resources  3/16/2023 1607 by Анна Hendrickson RN  Outcome: Completed  3/16/2023 0901 by Ramiro Pena RN  Outcome: Progressing  Flowsheets (Taken 3/16/2023 0830)  Discharge to home or other facility with appropriate resources:   Identify barriers to discharge with patient and caregiver   Arrange for needed discharge resources and transportation as appropriate   Identify discharge learning needs (meds, wound care, etc)   Refer to discharge planning if patient needs post-hospital services based on physician order or complex needs related to functional status, cognitive ability or social support system     Problem: Pain  Goal: Verbalizes/displays adequate comfort level or baseline comfort level  3/16/2023 1607 by Анна Hendrickson RN  Outcome: Completed  3/16/2023 0901 by Ramiro Pena RN  Outcome: Progressing     Problem: Musculoskeletal - Adult  Goal: Return mobility to safest level of function  3/16/2023 1607 by Анна Hendrickson RN  Outcome: Completed  3/16/2023 0901 by Ramiro Pena RN  Outcome: Progressing  Flowsheets (Taken 3/16/2023 0830)  Return Mobility to Safest Level of Function:   Assess patient stability and activity tolerance for standing, transferring and ambulating with or without assistive devices   Assist with transfers and ambulation using safe patient handling equipment as needed   Obtain physical therapy/occupational therapy consults as needed  Goal: Maintain proper alignment of affected body part  3/16/2023 1607 by Анна Hendrickson RN  Outcome: Completed  3/16/2023 0901 by Ramiro Pena RN  Outcome: Progressing  Goal: Return ADL status to a safe level of function  3/16/2023 1607 by Анна Hendrickson RN  Outcome: Completed  3/16/2023 0901 by Diallo Jarrell RN  Outcome: Progressing     Problem: Metabolic/Fluid and Electrolytes - Adult  Goal: Hemodynamic stability and optimal renal function maintained  3/16/2023 1607 by Bo Luis RN  Outcome: Completed  3/16/2023 0901 by Diallo Jarrell RN  Outcome: Progressing  Goal: Electrolytes maintained within normal limits  3/16/2023 1607 by Bo Luis RN  Outcome: Completed  3/16/2023 0901 by Diallo Jarrell RN  Outcome: Progressing  Goal: Glucose maintained within prescribed range  3/16/2023 1607 by Bo Luis RN  Outcome: Completed  3/16/2023 0901 by Diallo Jarrell RN  Outcome: Progressing     Problem: Hematologic - Adult  Goal: Maintains hematologic stability  3/16/2023 1607 by Bo Luis RN  Outcome: Completed  3/16/2023 0901 by Diallo Jarrell RN  Outcome: Progressing     Problem: Cardiovascular - Adult  Goal: Maintains optimal cardiac output and hemodynamic stability  3/16/2023 1607 by Bo Luis RN  Outcome: Completed  3/16/2023 0901 by Diallo Jarrell RN  Outcome: Progressing     Problem: Skin/Tissue Integrity - Adult  Goal: Incisions, wounds, or drain sites healing without S/S of infection  3/16/2023 1607 by Bo Luis RN  Outcome: Completed  3/16/2023 0901 by Diallo Jarrell RN  Outcome: Progressing     Problem: Skin/Tissue Integrity  Goal: Absence of new skin breakdown  Description: 1. Monitor for areas of redness and/or skin breakdown  2. Assess vascular access sites hourly  3. Every 4-6 hours minimum:  Change oxygen saturation probe site  4. Every 4-6 hours:  If on nasal continuous positive airway pressure, respiratory therapy assess nares and determine need for appliance change or resting period.   3/16/2023 1607 by Bo Luis RN  Outcome: Completed  3/16/2023 0901 by Diallo Jarrell RN  Outcome: Progressing     Problem: Safety - Adult  Goal: Free from fall injury  3/16/2023 1607 by Bo Luis RN  Outcome: Completed  3/16/2023 0901 by Ricarda Sargent RN  Outcome: Progressing     Problem: Discharge Planning  Goal: Discharge to home or other facility with appropriate resources  3/16/2023 1607 by Benjy Aguilar RN  Outcome: Completed  3/16/2023 0901 by Ricarda Sargent RN  Outcome: Progressing  Flowsheets (Taken 3/16/2023 0830)  Discharge to home or other facility with appropriate resources:   Identify barriers to discharge with patient and caregiver   Arrange for needed discharge resources and transportation as appropriate   Identify discharge learning needs (meds, wound care, etc)   Refer to discharge planning if patient needs post-hospital services based on physician order or complex needs related to functional status, cognitive ability or social support system     Problem: Pain  Goal: Verbalizes/displays adequate comfort level or baseline comfort level  3/16/2023 1607 by Benjy Aguilar RN  Outcome: Completed  3/16/2023 0901 by Ricarda Sargent RN  Outcome: Progressing     Problem: Musculoskeletal - Adult  Goal: Return mobility to safest level of function  3/16/2023 1607 by Benjy Aguilar RN  Outcome: Completed  3/16/2023 0901 by Ricarda Sargent RN  Outcome: Progressing  Flowsheets (Taken 3/16/2023 0830)  Return Mobility to Safest Level of Function:   Assess patient stability and activity tolerance for standing, transferring and ambulating with or without assistive devices   Assist with transfers and ambulation using safe patient handling equipment as needed   Obtain physical therapy/occupational therapy consults as needed     Problem: Musculoskeletal - Adult  Goal: Maintain proper alignment of affected body part  3/16/2023 1607 by Benjy Aguilar RN  Outcome: Completed  3/16/2023 0901 by Ricarda Sargent RN  Outcome: Progressing

## 2023-03-16 NOTE — CARE COORDINATION
Case Management Assessment  Initial Evaluation    Date/Time of Evaluation: 3/16/2023 1:53 PM  Assessment Completed by: VERA Lyon    If patient is discharged prior to next notation, then this note serves as note for discharge by case management. Patient Name: Jayro Francis                   YOB: 1980  Diagnosis: Ankle fracture, left, closed, initial encounter [S82.892A]  Bimalleolar ankle fracture, left, closed, initial encounter Terrell Richmond                   Date / Time: 3/15/2023  2:55 AM    Patient Admission Status: Inpatient   Readmission Risk (Low < 19, Mod (19-27), High > 27): Readmission Risk Score: 11    Current PCP: Willy Hernandez MD  PCP verified by CM? Yes    Chart Reviewed: Yes      History Provided by: Patient  Patient Orientation: Alert and Oriented    Patient Cognition: Alert    Hospitalization in the last 30 days (Readmission):  No    If yes, Readmission Assessment in  Navigator will be completed. Advance Directives:      Code Status: Full Code   Patient's Primary Decision Maker is: Legal Next of Kin      Discharge Planning:    Patient lives with: Alone Type of Home: House  Primary Care Giver: Self  Patient Support Systems include: Spouse/Significant Other, Family Members   Current Financial resources:    Current community resources:    Current services prior to admission: None            Current DME:              Type of Home Care services:  None    ADLS  Prior functional level: Independent in ADLs/IADLs  Current functional level:      PT AM-PAC: 19 /24  OT AM-PAC: 21 /24    Family can provide assistance at DC: Yes  Would you like Case Management to discuss the discharge plan with any other family members/significant others, and if so, who? Yes  Plans to Return to Present Housing: Yes  Other Identified Issues/Barriers to RETURNING to current housing:   Potential Assistance needed at discharge:  Outpatient PT/OT, Durable Medical Equipment            Potential DME: Patient expects to discharge to: House  Plan for transportation at discharge: Self (Family to transport home)    Financial    Payor: BCBS / Plan: BCBS - [place] PPO / Product Type: *No Product type* /     Does insurance require precert for SNF: Yes    Potential assistance Purchasing Medications: No  Meds-to-Beds request: Yes      [place] [phone] [facility] [phone]  [address] [address]  Phone: [phone] Fax: [phone]      Notes:    Factors facilitating achievement of predicted outcomes: Family support, Motivated, Cooperative, and Pleasant    Barriers to discharge: Additional Case Management Notes: The SW met with the patient. The patient is independent at baseline. PT/OT recommended the patient return home with [address]. The patient is agreeable to [address] and a [address] list has been left with the patient to review. The Plan for Transition of Care is related to the following treatment goals of Ankle fracture, left, closed, initial encounter [S82.892A]  Bimalleolar ankle fracture, left, closed, initial encounter [R05.510J]    IF APPLICABLE: The Patient and/or patient representative Elizabeth Goldstein and his family were provided with a choice of provider and agrees with the discharge plan. Freedom of choice list with basic dialogue that supports the patient's individualized plan of care/goals and shares the quality data associated with the providers was provided to:     Patient Representative Name:       The Patient and/or Patient Representative Agree with the Discharge Plan?       VERA Bennett  Case Management Department  Ph: [phone] Fax: [phone]

## 2023-03-16 NOTE — PLAN OF CARE
Problem: Safety - Adult  Goal: Free from fall injury  3/16/2023 0032 by Katlyn Peterson RN  Outcome: Progressing  3/15/2023 1103 by Edda Baker RN  Outcome: Progressing     Problem: Discharge Planning  Goal: Discharge to home or other facility with appropriate resources  3/16/2023 0032 by Katlyn Peterson RN  Outcome: Progressing  Flowsheets (Taken 3/15/2023 1916 by Naa Holden RN)  Discharge to home or other facility with appropriate resources: Identify barriers to discharge with patient and caregiver  3/15/2023 1103 by Edda Baker RN  Outcome: Progressing     Problem: Pain  Goal: Verbalizes/displays adequate comfort level or baseline comfort level  3/16/2023 0032 by Katlyn Peterson RN  Outcome: Progressing  3/15/2023 1103 by Edda Baker RN  Outcome: Progressing     Problem: Musculoskeletal - Adult  Goal: Return mobility to safest level of function  3/16/2023 0032 by Katlyn Peterson RN  Outcome: Progressing  3/15/2023 1103 by Edda Baker RN  Outcome: Progressing  Goal: Maintain proper alignment of affected body part  3/16/2023 0032 by Katlyn Peterson RN  Outcome: Progressing  3/15/2023 1103 by Edda Baker RN  Outcome: Progressing  Goal: Return ADL status to a safe level of function  3/16/2023 0032 by Katlyn Peterson RN  Outcome: Progressing  3/15/2023 1103 by Edda Baker RN  Outcome: Progressing     Problem: Metabolic/Fluid and Electrolytes - Adult  Goal: Hemodynamic stability and optimal renal function maintained  3/16/2023 0032 by Katlyn Peterson RN  Outcome: Progressing  3/15/2023 1103 by Edda Baker RN  Outcome: Progressing  Goal: Electrolytes maintained within normal limits  3/16/2023 0032 by Katlyn Peterson RN  Outcome: Progressing  3/15/2023 1103 by Edda Baker RN  Outcome: Progressing  Goal: Glucose maintained within prescribed range  3/16/2023 0032 by Katlyn Peterson RN  Outcome: Progressing  3/15/2023 1103 by Edda Baker RN  Outcome: Progressing     Problem: Hematologic - Adult  Goal: Maintains hematologic stability  3/16/2023 0032 by Jaylene Pinon RN  Outcome: Progressing  3/15/2023 1103 by Jenni Morales RN  Outcome: Progressing     Problem: Cardiovascular - Adult  Goal: Maintains optimal cardiac output and hemodynamic stability  3/16/2023 0032 by Jaylene Pinon RN  Outcome: Progressing  3/15/2023 1103 by Jenni Morales RN  Outcome: Progressing     Problem: Skin/Tissue Integrity - Adult  Goal: Incisions, wounds, or drain sites healing without S/S of infection  3/16/2023 0032 by Jaylene Pinon RN  Outcome: Progressing  3/15/2023 1103 by Jenni Morales RN  Outcome: Progressing     Problem: Skin/Tissue Integrity  Goal: Absence of new skin breakdown  Description: 1.  Monitor for areas of redness and/or skin breakdown  2.  Assess vascular access sites hourly  3.  Every 4-6 hours minimum:  Change oxygen saturation probe site  4.  Every 4-6 hours:  If on nasal continuous positive airway pressure, respiratory therapy assess nares and determine need for appliance change or resting period.  3/16/2023 0032 by Jaylene Pinon RN  Outcome: Progressing  3/15/2023 1103 by Jenni Morales RN  Outcome: Progressing

## 2023-03-16 NOTE — PROGRESS NOTES
[facility] [address]   Phone: [phone]    Physical Therapy    [x] Initial Evaluation            [] Daily Treatment Note         [] Discharge Summary      Patient: Gordon Amezquita   : 1980   MRN: [id number]   Date of Service:  3/16/2023  Admitting Diagnosis: Ankle fracture, left, closed, initial encounter  Current Admission Summary: Gordon Amezquita is a 40 y.o. male who presented to [facility] yesterday after he slipped off a stepladder landing on his left leg. Unable to ambulate afterwards. Independent imaging review of the left ankle via plain films demonstrated: displaced bimalleolar ankle fracture with subluxation/dislocation. Post- reduction films are stable with persistent tibio talar dislocation  S/p ORIF 3/15  Past Medical History:  has a past medical history of Chronic kidney disease, Hypercalcemia, and Sarcoidosis. Past Surgical History:  has a past surgical history that includes orthopedic surgery and Neck surgery. Discharge Recommendations: Gordon Amezquita scored a 19/24 on the AM-PAC short mobility form. Current research shows that an AM-PAC score of 18 or greater is typically associated with a discharge to the patient's home setting. Based on the patient's AM-PAC score and their current functional mobility deficits, it is recommended that the patient have 2-3 sessions per week of Physical Therapy at d/c to increase the patient's independence. At this time, this patient demonstrates the endurance and safety to discharge home with  (home services) and a follow up treatment frequency of 2-3x/wk. Please see assessment section for further patient specific details.   HOME HEALTH CARE: LEVEL 1 STANDARD    - Initial home health evaluation to occur within 24-48 hours, in patient home   - Therapy to evaluate with goal of regaining prior level of functioning   - Therapy to evaluate if patient has [address] needs for personal care    If patient discharges prior to next session this note will serve as a discharge summary. Please see below for the latest assessment towards goals. DME Required For Discharge: rolling walker  Precautions/Restrictions: high fall risk  Weight Bearing Restrictions: non weight bearing   [x] Left Lower Extremity     Required Braces/Orthotics:  L ankle splint  Positional Restrictions:no positional restrictions    Pre-Admission Information   Lives With: alone    Type of Home: house  Home Layout: one level, laundry in basement  Home Access:  4 step to enter with handrail. Handrails are located on R side. Bathroom Layout: tub/shower unit  Bathroom Equipment:  has access to shower chair  Toilet Height: standard height  Home Equipment: no prior equipment, has access to SW and crutches  Transfer Assistance: Independent without use of device  Ambulation Assistance:Independent without use of device  ADL Assistance: independent with all ADL's  IADL Assistance: independent with homemaking tasks  Active :        [x] Yes  [] No  Hand Dominance: [] Left  [] Right  Current Employment: full time employment. Occupation:   Hobbies:   Recent Falls: 1 fall - off small ladder resulting in current admission  States he might stay with his parents temporarily as he lives alone and they don't have steps. Examination   Vision:   Vision Gross Assessment: WFL  Hearing:   WFL  Sensation:   WFL  ROM:   (B) LE AROM WFL, except L ankle not assessed due to surgery  Strength:   (B) LE strength grossly WFL, except L ankle not assessed due to surgery  Therapist Clinical Decision Making (Complexity): low complexity  Clinical Presentation: stable      Subjective  General: Supine in bed upon arrival with alarm on. Agreeable to therapy. Pain: 5/10.   Location: L ankle  Pain Interventions: pain medication in place prior to arrival, ice applied, and repositioned        Functional Mobility  Bed Mobility  Supine to Sit: modified independent  Sit to Supine: modified independent  Scooting: modified independent  Comments:  Transfers  Sit to stand transfer: stand by assistance  Stand to sit transfer: stand by assistance  Car transfer: stand by assistance  Comments: transferred from bed with RW, transport chair with crutches x 1, with RW x 1 and shower chair with RW x 1  Ambulation  Surface:level surface  Assistive Device: rolling walker  Assistance: stand by assistance  Distance: 40'  Gait Mechanics: steady, hop to pattern, decreased jennifer  Comments:    Stair Mobility  Number of Steps: 4  Hand Rails: (R) ascending handrail  Device: axillary crutches  Assistance: stand by assistance  Comments: steady, hop to pattern using R ascending rail and 1 axillary crutch  Ambulation Trial 2  Surface:level surface  Assistive Device: axillary crutches  Assistance: stand by assistance, contact guard assistance  Distance: 20'  Gait Mechanics: hop to pattern, 2 minor LOB - able to self correct  Comments:      Balance  Static Sitting Balance: good(+): independent with high level dynamic balance in unsupported position  Dynamic Sitting Balance: good(+): independent with high level dynamic balance in unsupported position  Static Standing Balance: fair (-): maintains balance at SBA with use of UE support  Dynamic Standing Balance: fair (-): maintains balance at SBA with use of UE support  Comments:    Other Therapeutic Interventions    Functional Outcomes  AM-PAC Inpatient Mobility Raw Score : 19              Cognition  WFL  Orientation:    alert and oriented x 4  Command Following:   [facility]    Education  Barriers To Learning: none  Patient Education: patient educated on goals, PT role and benefits, plan of care, precautions, weight-bearing education, general safety, functional mobility training, transfer training, discharge recommendations, potential for knee scooter rental  Learning Assessment:  patient verbalizes and demonstrates understanding    Assessment  Activity Tolerance: good  Impairments Requiring Therapeutic Intervention: decreased functional mobility, decreased safety awareness, decreased endurance, decreased balance  Prognosis: good  Clinical Assessment: Patient not at baseline function and would benefit from skilled PT to address above deficits and facilitate return to baseline function. Patient able to demonstrate safety with transfers and short distance ambulation. Improved quality of gait noted with RW vs axillary crutches.     Safety Interventions: patient left in bed, bed alarm in place, call light within reach, gait belt, patient at risk for falls, nurse notified, and family/caregiver present    Plan  Frequency: 7 x/week  Current Treatment Recommendations: strengthening, balance training, functional mobility training, transfer training, gait training, stair training, endurance training, safety education, and equipment evaluation/education    Goals  Patient Goals: to be able to move around home   Short Term Goals:  Time Frame: discharge  Patient will complete bed mobility at [facility]   Patient will complete transfers at [address]   Patient will ambulate 50 ft with use of LRAD at [facility]  Patient will ascend/descend 4 stairs with (R) ascending handrail at modified independent with axillary crutch  Patient will complete car transfer at [facility]    Therapy Session Time      Individual Group Co-treatment   Time In     0838   Time Out     0918   Minutes     40     Timed Code Treatment Minutes:  25 Minutes  Total Treatment Minutes:  40       Electronically Signed By: Velasquez Steele PT      Thanks, Velasquez Steele PT, DPT [id number]

## 2023-03-16 NOTE — PROGRESS NOTES
Assessment completed. VSS. Patient awake, alert, and in bed. Medications given per MAR. Fluids continued. Dressing to LLE clean, dry, and intact with bilat ice packs in place. Left leg elevated. Safety precautions in place. Call light and urinal within reach. Will continue to monitor.

## 2023-03-16 NOTE — PROGRESS NOTES
4:05 PM  Reviewed discharge instructions with patient. Patient verbalized understanding and denies any questions. Scripts delivered by pharmacy. Patient discharged to home with all belongings.

## 2023-03-16 NOTE — PROGRESS NOTES
[facility] [address]   Phone: [phone]    Occupational Therapy    [x] Initial Evaluation            [] Daily Treatment Note         [] Discharge Summary      Patient: Tanja Triana   : 1980   MRN: [id number]   Date of Service:  3/16/2023    Admitting Diagnosis:  Ankle fracture, left, closed, initial encounter  Current Admission Summary: Pt fell off a 3 foot step ladder and broke the ankle. S/p L ORIF ankle  Past Medical History:  has a past medical history of Chronic kidney disease, Hypercalcemia, and Sarcoidosis. Past Surgical History:  has a past surgical history that includes orthopedic surgery and Neck surgery. Discharge Recommendations: Tanja Triana scored a 21/24 on the AM-PAC ADL Inpatient form. Current research shows that an AM-PAC score of 18 or greater is typically associated with a discharge to the patient's home setting. Based on the patient's AM-PAC score, and their current ADL deficits, it is recommended that the patient have 2-3 sessions per week of Occupational Therapy at d/c to increase the patient's independence. At this time, this patient demonstrates the endurance and safety to discharge home with home (home vs OP services) and a follow up treatment frequency of 2-3x/wk. Please see assessment section for further patient specific details. If patient discharges prior to next session this note will serve as a discharge summary. Please see below for the latest assessment towards goals.   HOME HEALTH CARE: LEVEL 1 STANDARD    - Initial home health evaluation to occur within 24-48 hours, in patient home   - Therapy to evaluate with goal of regaining prior level of functioning   - Therapy to evaluate if patient has [address] needs for personal care       DME Required For Discharge: rolling walker, tub transfer bench    Precautions/Restrictions: high fall risk  Weight Bearing Restrictions: non weight bearing  [] Right Upper Extremity [] Left Upper Extremity [] Right Lower Extremity  [x] Left Lower Extremity     Required Braces/Orthotics: no braces required   [] Right  [] Left  Positional Restrictions:no positional restrictions    Pre-Admission Information   Lives With: alone                     Type of Home: house  Home Layout: one level, laundry in basement  Home Access:  4 step to enter with handrail. Handrails are located on R side. Bathroom Layout: tub/shower unit  Bathroom Equipment:  has access to shower chair  Toilet Height: standard height  Home Equipment: no prior equipment, has access to SW and crutches  Transfer Assistance: Independent without use of device  Ambulation Assistance:Independent without use of device  ADL Assistance: independent with all ADL's  IADL Assistance: independent with homemaking tasks  Active :        [x] Yes                 [] No  Hand Dominance: [] Left                 [] Right  Current Employment: full time employment. Occupation:   Hobbies:   Recent Falls: 1 fall - off small ladder resulting in current admission  States he might stay with his parents temporarily as he lives alone and they don't have steps. Examination   Vision:   Vision Gross Assessment: WFL  Hearing:   WFL  Observation:   General Observation:  L LE ace wrap  Posture:   good  Tone:   Normotonic    ROM:   (B) UE AROM WFL  Strength:   (B) UE strength grossly WFL    Therapist Clinical Decision Making (Complexity): low complexity  Clinical Presentation: stable      Subjective  General: patient supine in bed on arrival, agreeable to therapy evaluation. Mother present  Pain: 5/10. Location: L LE  Pain Interventions: RN notified of patient request for pain medication        Activities of Daily Living  Basic Activities of Daily Living  Lower Extremity Dressing: minimal assistance Comment: A to manage pants over brace, pt able to thread and don in stance with SBA- intermittent UE support on walker or bed rail.  Good adherence to NWB precautions. Educated pt on completing task seated with lateral weight shifts  Instrumental Activities of Daily Living  No IADL completed on this date. Functional Mobility  Bed Mobility  Supine to Sit: modified independent  Sit to Supine: modified independent  Comments: HOB elevated  Transfers  Sit to stand transfer:stand by assistance  Stand to sit transfer: stand by assistance  Tub transfer: stand by assistance  Tub transfer comments: recommend use of TTB for ease of transfer, pt reports good understanding  Comments: STS from EOB, transport chair SBA, trialed FWW and crutches, requires verbal cues with hand placement with good carryover. Functional Mobility:  Standing Balance: stand by assistance. Functional Mobility: .  stand by assistance  Functional Mobility Activity: 40ft with FWW, 20 ft with crutches  Functional Mobility Comment: verbal cues for gait pattern and to manage FWW with good carryover. Pt with slight LOB x2 with crutches, no LOB noted with FWW, good adherence to NWB LLE throughout ambulation- pt reports he prefers FWW    Other Therapeutic Interventions  Pt manages ~5steps with R hand rail and L crutch.  See PT note for clarification  Functional Outcomes  AM-PAC Inpatient Daily Activity Raw Score: 21    Cognition  WFL  Orientation:    alert and oriented x 4  Command Following:   [facility]     Education  Barriers To Learning: none  Patient Education: patient educated on OT role and benefits, plan of care, precautions, ADL adaptive strategies, weight-bearing education, proper use of assistive device/equipment, energy conservation, transfer training, discharge recommendations  Learning Assessment:  patient verbalizes and demonstrates understanding    Assessment  Activity Tolerance: tolerates well  Impairments Requiring Therapeutic Intervention: decreased functional mobility, decreased ADL status, decreased endurance, decreased balance, decreased IADL  Prognosis: good  Clinical Assessment: patient presents s/p L ankle ORIF and is below baseline. Pt lives alone and would benefit from continued OT follow up services to promote safety and independence in occupational pursuits.   Safety Interventions: patient left in bed, call light within reach, gait belt, nurse notified, and family/caregiver present    Plan  Frequency: 7 x/week  Current Treatment Recommendations: strengthening, balance training, functional mobility training, transfer training, endurance training, patient/caregiver education, ADL/self-care training, and equipment evaluation/education    Goals  Patient Goals: to return home   Short Term Goals:  Time Frame: discharge  Patient will complete lower body ADL at modified independent   Patient will complete toileting at modified independent   Patient will complete functional transfers at [facility] independent   Patient will complete functional mobility at modified independent     Therapy Session Time     Individual Group Co-treatment   Time In    0839   Time Out    0918   Minutes    39        Timed Code Treatment Minutes:   24  Total Treatment Minutes:  39       Electronically Signed By: TAZ Ochoa/FRANSISCA [id number]

## 2023-03-16 NOTE — PLAN OF CARE
Problem: Safety - Adult  Goal: Free from fall injury  3/16/2023 0901 by Nany Bravo RN  Outcome: Progressing  3/16/2023 0032 by Johnny Gu RN  Outcome: Progressing     Problem: Discharge Planning  Goal: Discharge to home or other facility with appropriate resources  3/16/2023 0901 by Nany Bravo RN  Outcome: Progressing  Flowsheets (Taken 3/16/2023 0830)  Discharge to home or other facility with appropriate resources:   Identify barriers to discharge with patient and caregiver   Arrange for needed discharge resources and transportation as appropriate   Identify discharge learning needs (meds, wound care, etc)   Refer to discharge planning if patient needs post-hospital services based on physician order or complex needs related to functional status, cognitive ability or social support system  3/16/2023 0032 by Johnny Gu RN  Outcome: Progressing  Flowsheets (Taken 3/15/2023 1916 by Magdi Horan RN)  Discharge to home or other facility with appropriate resources: Identify barriers to discharge with patient and caregiver     Problem: Pain  Goal: Verbalizes/displays adequate comfort level or baseline comfort level  3/16/2023 0901 by Nany Bravo RN  Outcome: Progressing  3/16/2023 0032 by Johnny Gu RN  Outcome: Progressing     Problem: Musculoskeletal - Adult  Goal: Return mobility to safest level of function  3/16/2023 0901 by Nany Bravo RN  Outcome: Progressing  Flowsheets (Taken 3/16/2023 0830)  Return Mobility to Safest Level of Function:   Assess patient stability and activity tolerance for standing, transferring and ambulating with or without assistive devices   Assist with transfers and ambulation using safe patient handling equipment as needed   Obtain physical therapy/occupational therapy consults as needed  3/16/2023 0032 by Johnny Gu RN  Outcome: Progressing  Goal: Maintain proper alignment of affected body part  3/16/2023 0901 by Nany Bravo RN  Outcome: Progressing  3/16/2023 0032 by Buck Nageotte, RN  Outcome: Progressing  Goal: Return ADL status to a safe level of function  3/16/2023 0901 by Joel Carey RN  Outcome: Progressing  3/16/2023 0032 by Buck Nageotte, RN  Outcome: Progressing     Problem: Metabolic/Fluid and Electrolytes - Adult  Goal: Hemodynamic stability and optimal renal function maintained  3/16/2023 0901 by Joel Carey RN  Outcome: Progressing  3/16/2023 0032 by Buck Nageotte, RN  Outcome: Progressing  Goal: Electrolytes maintained within normal limits  3/16/2023 0901 by Joel Carey RN  Outcome: Progressing  3/16/2023 0032 by Buck Nageotte, RN  Outcome: Progressing  Goal: Glucose maintained within prescribed range  3/16/2023 0901 by Joel Carey RN  Outcome: Progressing  3/16/2023 0032 by Buck Nageotte, RN  Outcome: Progressing     Problem: Hematologic - Adult  Goal: Maintains hematologic stability  3/16/2023 0901 by Joel Carey RN  Outcome: Progressing  3/16/2023 0032 by Buck Nageotte, RN  Outcome: Progressing     Problem: Cardiovascular - Adult  Goal: Maintains optimal cardiac output and hemodynamic stability  3/16/2023 0901 by Joel Carey RN  Outcome: Progressing  3/16/2023 0032 by Buck Nageotte, RN  Outcome: Progressing     Problem: Skin/Tissue Integrity - Adult  Goal: Incisions, wounds, or drain sites healing without S/S of infection  3/16/2023 0901 by Joel Carey RN  Outcome: Progressing  3/16/2023 0032 by Buck Nageotte, RN  Outcome: Progressing     Problem: Skin/Tissue Integrity  Goal: Absence of new skin breakdown  Description: 1. Monitor for areas of redness and/or skin breakdown  2. Assess vascular access sites hourly  3. Every 4-6 hours minimum:  Change oxygen saturation probe site  4. Every 4-6 hours:  If on nasal continuous positive airway pressure, respiratory therapy assess nares and determine need for appliance change or resting period.   3/16/2023 0901 by Joel Carey RN  Outcome: Progressing  3/16/2023 0032 by Sara Mcgee RN  Outcome: Progressing

## 2023-03-16 NOTE — DISCHARGE SUMMARY
[facility]    Patient Demographics    Patient. Demetrice Kaba  Date of Birth. 1980  MRN. [id number]     Primary care provider. Perla Rogers MD  (Tel: None)    Admit date: 3/15/2023    Discharge date 3/16/2023  Note Date: 3/16/2023     Reason for Hospitalization. Chief Complaint   Patient presents with    Fall     Pt arrives in the ED c/o ankle pain. Pt was on a ladder and fell off about 6 feet off the ground. Pt landed on his ankle 10/10 pain and hit his head. .  Pt stated his vision was double. Pt still has feeling in his toes          Significant Findings. Principal Problem:    Ankle fracture, left, closed, initial encounter  Active Problems:    HTN (hypertension)    CKD (chronic kidney disease)    Sarcoid  Resolved Problems:    * No resolved hospital problems. *       Problems and results from this hospitalization that need follow up. Ankle fracture : follow up with ortho at [facility] on Monday March 20 th   Sarcoid:  follow up with pulmonary at [facility]   Stop smoking     Significant test results and incidental findings. Invasive procedures and treatments. 3/15/23     OPEN REDUCTION INTERNAL FIXATION LEFT ANKLE-SYNTHES (Left)      3/15/2023  Post op:  Status post ORIF of the medial and lateral malleoli without evidence of   complication. CT done post operatively     Acute trimalleolar fracture of the left ankle with postoperative changes   of ORIF of the medial malleolus and lateral malleolus. 2. Obliquely oriented fracture fragment along the anterior distal tibia   extending to the tibial plafond corresponds to new fracture line seen on   radiograph from March 15, 2023 timed 6:05 p.m. Sallyanne Chain The fracture fragment is   displaced anteriorly by approximately 3 mm. 3. Tibiotalar joint effusion with several intra-articular bodies along the   anterior joint line.    4. Small ossicle along the dorsal talus may reflect displaced tibial fracture   fragments versus chip fracture of the dorsal talus. 5. Soft tissue edema and subcutaneous gas of the ankle. [facility] Course. The patient had a fall off of a 3 foot step ladder and sustained a left ankle fracture. He was admitted and followed by ortho. He was treated for the following:      Fall from ladder with subsequent left ankle fracture:  He had ORIF on 3/15/23 and while in the pacu he became very restless and was moving about. Nursing staff heard a \"pop\" CT imaging was completed prior to his d/c home. He will need additional surgery at [facility] on 3/20/23 . Pt lives alone and will stay with his parents after d/c.  he did well while working with therapy and a walker which he has at home. Sarcoid:  follows with pulmonary,  on daily prednisone at 5 mg , also on plaquenil , notes from Dr Gertrude Chaney reviewed   CKD:  creat stable at 1.6  will add IV hydration since he is NPO , creat was 2.0 in February   Elevated LFT's:  will follow , this is not a new finding   HTN: on ARB, intermittent elevations likely due to pain   Hx of gout: continue with daily allopurinol   Tobacco use:  cessation encouraged, smokes 2 packs per week     Consults. IP CONSULT TO ORTHOPEDIC SURGERY    Physical examination on discharge day. BP (!) 138/94   Pulse 84   Temp 98.1 °F (36.7 °C) (Oral)   Resp 18   Ht 6' 1\" (1.854 m)   Wt 264 lb 3.2 oz (119.8 kg)   SpO2 96%   BMI 34.86 kg/m²   General appearance. Alert. Looks comfortable. HEENT. Sclera clear. Moist mucus membranes. Cardiovascular. Regular rate and rhythm, normal S1, S2. No murmur. Respiratory. Not using accessory muscles. Clear to auscultation bilaterally, no wheeze. Gastrointestinal. Abdomen soft, non-tender, not distended, normal bowel sounds  Neurology. Facial symmetry. No speech deficits. Moving all extremities equally. Extremities. No edema in lower extremities. Skin. Warm, dry, normal turgor    Condition at time of discharge stable     Medication instructions provided to patient at discharge. Medication List        START taking these medications      amLODIPine 2.5 MG tablet  Commonly known as: NORVASC     nicotine 14 MG/24HR  Commonly known as: [address] [facility] 1 patch onto the skin daily  Start taking on: March 17, 2023     oxyCODONE 5 MG immediate release tablet  Commonly known as: Roxicodone  Take 1 tablet by mouth every 4 hours as needed for Pain for up to 7 days. Intended supply: 7 days. Take lowest dose possible to manage pain Max Daily Amount: 30 mg            CONTINUE taking these medications      allopurinol 300 MG tablet  Commonly known as: ZYLOPRIM     colchicine 0.6 MG tablet  Commonly known as: COLCRYS     FOLIC ACID PO     hydroxychloroquine 200 MG tablet  Commonly known as: PLAQUENIL     losartan 50 MG tablet  Commonly known as: COZAAR     predniSONE 5 MG Tbec  Take 30 mg by mouth daily            STOP taking these medications      ketoconazole 200 MG tablet  Commonly known as: NIZORAL     lisinopril 10 MG tablet  Commonly known as: PRINIVIL;ZESTRIL     potassium chloride 10 MEQ extended release tablet  Commonly known as: KLOR-CON M               Where to Get Your Medications        These medications were sent to [facility], [address] [place] [address], [address] [place] [address] Road      Phone: [phone]   nicotine 14 MG/24HR  oxyCODONE 5 MG immediate release tablet         Discharge recommendations given to patient. Follow Up. On 3/20/23   Disposition. home  Activity. activity as tolerated  Diet: ADULT DIET; Regular      Spent 35 minutes in discharge process.     Signed:  HARPREET Degroot CNP     3/16/2023 8:17 AM

## 2023-03-17 ENCOUNTER — TELEPHONE (OUTPATIENT)
Dept: ORTHOPEDIC SURGERY | Age: 43
End: 2023-03-17

## 2023-03-17 DIAGNOSIS — S82.872A CLOSED DISPLACED PILON FRACTURE OF LEFT TIBIA, INITIAL ENCOUNTER: Primary | ICD-10-CM

## 2023-03-17 NOTE — TELEPHONE ENCOUNTER
Auth: NPR  Date: 03/20/23 thru 06/21/23  Reference # [id number]  Spoke with: Online  Type of SX: Outpatient  Location: W  CPT: 39605   DX: [phone]  SX area: LT leg  Insurance: [employer]

## 2023-03-19 ENCOUNTER — ANESTHESIA EVENT (OUTPATIENT)
Dept: OPERATING ROOM | Age: 43
End: 2023-03-19
Payer: COMMERCIAL

## 2023-03-20 ENCOUNTER — ANESTHESIA (OUTPATIENT)
Dept: OPERATING ROOM | Age: 43
End: 2023-03-20
Payer: COMMERCIAL

## 2023-03-20 ENCOUNTER — TELEPHONE (OUTPATIENT)
Dept: ORTHOPEDIC SURGERY | Age: 43
End: 2023-03-20

## 2023-03-20 ENCOUNTER — HOSPITAL ENCOUNTER (OUTPATIENT)
Age: 43
Setting detail: OUTPATIENT SURGERY
Discharge: HOME | End: 2023-03-20
Attending: ORTHOPAEDIC SURGERY | Admitting: ORTHOPAEDIC SURGERY
Payer: COMMERCIAL

## 2023-03-20 ENCOUNTER — APPOINTMENT (OUTPATIENT)
Dept: GENERAL RADIOLOGY | Age: 43
End: 2023-03-20
Attending: ORTHOPAEDIC SURGERY
Payer: COMMERCIAL

## 2023-03-20 VITALS
OXYGEN SATURATION: 95 % | DIASTOLIC BLOOD PRESSURE: 82 MMHG | HEIGHT: 73 IN | HEART RATE: 103 BPM | RESPIRATION RATE: 16 BRPM | BODY MASS INDEX: 34.99 KG/M2 | TEMPERATURE: 97.5 F | WEIGHT: 264 LBS | SYSTOLIC BLOOD PRESSURE: 125 MMHG

## 2023-03-20 PROBLEM — S82.872A CLOSED DISPLACED PILON FRACTURE OF LEFT TIBIA: Status: ACTIVE | Noted: 2023-03-20

## 2023-03-20 PROCEDURE — 7100000000 HC PACU RECOVERY - FIRST 15 MIN: Performed by: ORTHOPAEDIC SURGERY

## 2023-03-20 PROCEDURE — 2709999900 HC NON-CHARGEABLE SUPPLY: Performed by: ORTHOPAEDIC SURGERY

## 2023-03-20 PROCEDURE — 3700000000 HC ANESTHESIA ATTENDED CARE: Performed by: ORTHOPAEDIC SURGERY

## 2023-03-20 PROCEDURE — A4217 STERILE WATER/SALINE, 500 ML: HCPCS | Performed by: ORTHOPAEDIC SURGERY

## 2023-03-20 PROCEDURE — 3700000001 HC ADD 15 MINUTES (ANESTHESIA): Performed by: ORTHOPAEDIC SURGERY

## 2023-03-20 PROCEDURE — 6360000002 HC RX W HCPCS: Performed by: ANESTHESIOLOGY

## 2023-03-20 PROCEDURE — 99204 OFFICE O/P NEW MOD 45 MIN: CPT | Performed by: ORTHOPAEDIC SURGERY

## 2023-03-20 PROCEDURE — 6370000000 HC RX 637 (ALT 250 FOR IP): Performed by: STUDENT IN AN ORGANIZED HEALTH CARE EDUCATION/TRAINING PROGRAM

## 2023-03-20 PROCEDURE — C1713 ANCHOR/SCREW BN/BN,TIS/BN: HCPCS | Performed by: ORTHOPAEDIC SURGERY

## 2023-03-20 PROCEDURE — 2580000003 HC RX 258: Performed by: ORTHOPAEDIC SURGERY

## 2023-03-20 PROCEDURE — 27827 TREAT LOWER LEG FRACTURE: CPT | Performed by: NURSE PRACTITIONER

## 2023-03-20 PROCEDURE — 73590 X-RAY EXAM OF LOWER LEG: CPT

## 2023-03-20 PROCEDURE — 7100000001 HC PACU RECOVERY - ADDTL 15 MIN: Performed by: ORTHOPAEDIC SURGERY

## 2023-03-20 PROCEDURE — 3600000005 HC SURGERY LEVEL 5 BASE: Performed by: ORTHOPAEDIC SURGERY

## 2023-03-20 PROCEDURE — 3600000015 HC SURGERY LEVEL 5 ADDTL 15MIN: Performed by: ORTHOPAEDIC SURGERY

## 2023-03-20 PROCEDURE — 2500000003 HC RX 250 WO HCPCS: Performed by: NURSE ANESTHETIST, CERTIFIED REGISTERED

## 2023-03-20 PROCEDURE — 6360000002 HC RX W HCPCS: Performed by: ORTHOPAEDIC SURGERY

## 2023-03-20 PROCEDURE — 7100000011 HC PHASE II RECOVERY - ADDTL 15 MIN: Performed by: ORTHOPAEDIC SURGERY

## 2023-03-20 PROCEDURE — 3209999900 FLUORO FOR SURGICAL PROCEDURES

## 2023-03-20 PROCEDURE — 2720000010 HC SURG SUPPLY STERILE: Performed by: ORTHOPAEDIC SURGERY

## 2023-03-20 PROCEDURE — 2500000003 HC RX 250 WO HCPCS: Performed by: ORTHOPAEDIC SURGERY

## 2023-03-20 PROCEDURE — C1769 GUIDE WIRE: HCPCS | Performed by: ORTHOPAEDIC SURGERY

## 2023-03-20 PROCEDURE — 6360000002 HC RX W HCPCS: Performed by: NURSE ANESTHETIST, CERTIFIED REGISTERED

## 2023-03-20 PROCEDURE — 7100000010 HC PHASE II RECOVERY - FIRST 15 MIN: Performed by: ORTHOPAEDIC SURGERY

## 2023-03-20 PROCEDURE — 27827 TREAT LOWER LEG FRACTURE: CPT | Performed by: ORTHOPAEDIC SURGERY

## 2023-03-20 PROCEDURE — 2580000003 HC RX 258: Performed by: NURSE ANESTHETIST, CERTIFIED REGISTERED

## 2023-03-20 PROCEDURE — 2580000003 HC RX 258: Performed by: ANESTHESIOLOGY

## 2023-03-20 DEVICE — IMPLANTABLE DEVICE: Type: IMPLANTABLE DEVICE | Site: ANKLE | Status: FUNCTIONAL

## 2023-03-20 DEVICE — SCREW BNE L30MM DIA3.5MM CORT ANK S STL NONLOCKING LO PROF: Type: IMPLANTABLE DEVICE | Site: ANKLE | Status: FUNCTIONAL

## 2023-03-20 DEVICE — SCREW BNE L28MM DIA3.5MM CORT ANK S STL NONLOCKING LO PROF: Type: IMPLANTABLE DEVICE | Site: ANKLE | Status: FUNCTIONAL

## 2023-03-20 DEVICE — KREULOCK COMPRESSION SCREW SS 2.7X42MM: Type: IMPLANTABLE DEVICE | Site: ANKLE | Status: FUNCTIONAL

## 2023-03-20 RX ORDER — FENTANYL CITRATE 50 UG/ML
50 INJECTION, SOLUTION INTRAMUSCULAR; INTRAVENOUS EVERY 5 MIN PRN
Status: DISCONTINUED | OUTPATIENT
Start: 2023-03-20 | End: 2023-03-20 | Stop reason: HOSPADM

## 2023-03-20 RX ORDER — SODIUM CHLORIDE 0.9 % (FLUSH) 0.9 %
5-40 SYRINGE (ML) INJECTION EVERY 12 HOURS SCHEDULED
Status: DISCONTINUED | OUTPATIENT
Start: 2023-03-20 | End: 2023-03-20 | Stop reason: HOSPADM

## 2023-03-20 RX ORDER — PHENYLEPHRINE HCL IN 0.9% NACL 1 MG/10 ML
SYRINGE (ML) INTRAVENOUS PRN
Status: DISCONTINUED | OUTPATIENT
Start: 2023-03-20 | End: 2023-03-20 | Stop reason: SDUPTHER

## 2023-03-20 RX ORDER — OXYCODONE HYDROCHLORIDE AND ACETAMINOPHEN 5; 325 MG/1; MG/1
2 TABLET ORAL
Status: COMPLETED | OUTPATIENT
Start: 2023-03-20 | End: 2023-03-20

## 2023-03-20 RX ORDER — CEPHALEXIN 500 MG/1
500 CAPSULE ORAL 4 TIMES DAILY
Qty: 20 CAPSULE | Refills: 0 | Status: SHIPPED | OUTPATIENT
Start: 2023-03-20 | End: 2023-03-25

## 2023-03-20 RX ORDER — SODIUM CHLORIDE 9 MG/ML
INJECTION, SOLUTION INTRAVENOUS CONTINUOUS PRN
Status: DISCONTINUED | OUTPATIENT
Start: 2023-03-20 | End: 2023-03-20 | Stop reason: SDUPTHER

## 2023-03-20 RX ORDER — ONDANSETRON 2 MG/ML
4 INJECTION INTRAMUSCULAR; INTRAVENOUS
Status: DISCONTINUED | OUTPATIENT
Start: 2023-03-20 | End: 2023-03-20 | Stop reason: HOSPADM

## 2023-03-20 RX ORDER — SODIUM CHLORIDE 0.9 % (FLUSH) 0.9 %
5-40 SYRINGE (ML) INJECTION PRN
Status: DISCONTINUED | OUTPATIENT
Start: 2023-03-20 | End: 2023-03-20 | Stop reason: HOSPADM

## 2023-03-20 RX ORDER — ONDANSETRON 2 MG/ML
INJECTION INTRAMUSCULAR; INTRAVENOUS PRN
Status: DISCONTINUED | OUTPATIENT
Start: 2023-03-20 | End: 2023-03-20 | Stop reason: SDUPTHER

## 2023-03-20 RX ORDER — GLYCOPYRROLATE 0.2 MG/ML
INJECTION INTRAMUSCULAR; INTRAVENOUS PRN
Status: DISCONTINUED | OUTPATIENT
Start: 2023-03-20 | End: 2023-03-20 | Stop reason: SDUPTHER

## 2023-03-20 RX ORDER — SODIUM CHLORIDE 9 MG/ML
INJECTION, SOLUTION INTRAVENOUS PRN
Status: DISCONTINUED | OUTPATIENT
Start: 2023-03-20 | End: 2023-03-20 | Stop reason: HOSPADM

## 2023-03-20 RX ORDER — LORAZEPAM 2 MG/ML
0.5 INJECTION INTRAMUSCULAR
Status: DISCONTINUED | OUTPATIENT
Start: 2023-03-20 | End: 2023-03-20 | Stop reason: HOSPADM

## 2023-03-20 RX ORDER — FENTANYL CITRATE 50 UG/ML
INJECTION, SOLUTION INTRAMUSCULAR; INTRAVENOUS PRN
Status: DISCONTINUED | OUTPATIENT
Start: 2023-03-20 | End: 2023-03-20 | Stop reason: SDUPTHER

## 2023-03-20 RX ORDER — PROPOFOL 10 MG/ML
INJECTION, EMULSION INTRAVENOUS PRN
Status: DISCONTINUED | OUTPATIENT
Start: 2023-03-20 | End: 2023-03-20 | Stop reason: SDUPTHER

## 2023-03-20 RX ORDER — DIPHENHYDRAMINE HYDROCHLORIDE 50 MG/ML
12.5 INJECTION INTRAMUSCULAR; INTRAVENOUS
Status: DISCONTINUED | OUTPATIENT
Start: 2023-03-20 | End: 2023-03-20 | Stop reason: HOSPADM

## 2023-03-20 RX ORDER — LIDOCAINE HYDROCHLORIDE 20 MG/ML
INJECTION, SOLUTION EPIDURAL; INFILTRATION; INTRACAUDAL; PERINEURAL PRN
Status: DISCONTINUED | OUTPATIENT
Start: 2023-03-20 | End: 2023-03-20 | Stop reason: SDUPTHER

## 2023-03-20 RX ORDER — HALOPERIDOL 5 MG/ML
1 INJECTION INTRAMUSCULAR
Status: DISCONTINUED | OUTPATIENT
Start: 2023-03-20 | End: 2023-03-20 | Stop reason: HOSPADM

## 2023-03-20 RX ORDER — MEPERIDINE HYDROCHLORIDE 25 MG/ML
12.5 INJECTION INTRAMUSCULAR; INTRAVENOUS; SUBCUTANEOUS
Status: DISCONTINUED | OUTPATIENT
Start: 2023-03-20 | End: 2023-03-20 | Stop reason: HOSPADM

## 2023-03-20 RX ORDER — BUPIVACAINE HYDROCHLORIDE 5 MG/ML
INJECTION, SOLUTION EPIDURAL; INTRACAUDAL
Status: COMPLETED | OUTPATIENT
Start: 2023-03-20 | End: 2023-03-20

## 2023-03-20 RX ORDER — OXYCODONE HYDROCHLORIDE AND ACETAMINOPHEN 5; 325 MG/1; MG/1
1 TABLET ORAL
Status: COMPLETED | OUTPATIENT
Start: 2023-03-20 | End: 2023-03-20

## 2023-03-20 RX ORDER — DEXAMETHASONE SODIUM PHOSPHATE 4 MG/ML
INJECTION, SOLUTION INTRA-ARTICULAR; INTRALESIONAL; INTRAMUSCULAR; INTRAVENOUS; SOFT TISSUE PRN
Status: DISCONTINUED | OUTPATIENT
Start: 2023-03-20 | End: 2023-03-20 | Stop reason: SDUPTHER

## 2023-03-20 RX ADMIN — HYDROMORPHONE HYDROCHLORIDE 0.25 MG: 1 INJECTION, SOLUTION INTRAMUSCULAR; INTRAVENOUS; SUBCUTANEOUS at 09:16

## 2023-03-20 RX ADMIN — FENTANYL CITRATE 25 MCG: 50 INJECTION INTRAMUSCULAR; INTRAVENOUS at 08:27

## 2023-03-20 RX ADMIN — SODIUM CHLORIDE: 9 INJECTION, SOLUTION INTRAVENOUS at 07:39

## 2023-03-20 RX ADMIN — SODIUM CHLORIDE: 9 INJECTION, SOLUTION INTRAVENOUS at 08:31

## 2023-03-20 RX ADMIN — Medication 200 MCG: at 07:56

## 2023-03-20 RX ADMIN — FENTANYL CITRATE 75 MCG: 50 INJECTION INTRAMUSCULAR; INTRAVENOUS at 07:46

## 2023-03-20 RX ADMIN — PROPOFOL 200 MG: 10 INJECTION, EMULSION INTRAVENOUS at 07:46

## 2023-03-20 RX ADMIN — Medication 300 MCG: at 07:59

## 2023-03-20 RX ADMIN — OXYCODONE AND ACETAMINOPHEN 1 TABLET: 5; 325 TABLET ORAL at 09:56

## 2023-03-20 RX ADMIN — DEXAMETHASONE SODIUM PHOSPHATE 8 MG: 4 INJECTION, SOLUTION INTRAMUSCULAR; INTRAVENOUS at 07:46

## 2023-03-20 RX ADMIN — GLYCOPYRROLATE 0.2 MG: 0.2 INJECTION INTRAMUSCULAR; INTRAVENOUS at 07:39

## 2023-03-20 RX ADMIN — ONDANSETRON 4 MG: 2 INJECTION INTRAMUSCULAR; INTRAVENOUS at 08:27

## 2023-03-20 RX ADMIN — HYDROMORPHONE HYDROCHLORIDE 0.5 MG: 1 INJECTION, SOLUTION INTRAMUSCULAR; INTRAVENOUS; SUBCUTANEOUS at 08:50

## 2023-03-20 RX ADMIN — SODIUM CHLORIDE: 9 INJECTION, SOLUTION INTRAVENOUS at 06:45

## 2023-03-20 RX ADMIN — LIDOCAINE HYDROCHLORIDE 75 MG: 20 INJECTION, SOLUTION EPIDURAL; INFILTRATION; INTRACAUDAL; PERINEURAL at 07:46

## 2023-03-20 RX ADMIN — CEFAZOLIN 2000 MG: 2 INJECTION, POWDER, FOR SOLUTION INTRAMUSCULAR; INTRAVENOUS at 07:39

## 2023-03-20 RX ADMIN — HYDROMORPHONE HYDROCHLORIDE 0.25 MG: 1 INJECTION, SOLUTION INTRAMUSCULAR; INTRAVENOUS; SUBCUTANEOUS at 09:21

## 2023-03-20 RX ADMIN — LIDOCAINE HYDROCHLORIDE 50 MG: 20 INJECTION, SOLUTION EPIDURAL; INFILTRATION; INTRACAUDAL; PERINEURAL at 08:07

## 2023-03-20 RX ADMIN — FENTANYL CITRATE 25 MCG: 50 INJECTION INTRAMUSCULAR; INTRAVENOUS at 08:23

## 2023-03-20 RX ADMIN — FENTANYL CITRATE 25 MCG: 50 INJECTION INTRAMUSCULAR; INTRAVENOUS at 07:39

## 2023-03-20 ASSESSMENT — PAIN SCALES - GENERAL
PAINLEVEL_OUTOF10: 4
PAINLEVEL_OUTOF10: 5
PAINLEVEL_OUTOF10: 5

## 2023-03-20 ASSESSMENT — PAIN - FUNCTIONAL ASSESSMENT
PAIN_FUNCTIONAL_ASSESSMENT: PREVENTS OR INTERFERES SOME ACTIVE ACTIVITIES AND ADLS
PAIN_FUNCTIONAL_ASSESSMENT: ACTIVITIES ARE NOT PREVENTED
PAIN_FUNCTIONAL_ASSESSMENT: PREVENTS OR INTERFERES SOME ACTIVE ACTIVITIES AND ADLS
PAIN_FUNCTIONAL_ASSESSMENT: 0-10

## 2023-03-20 ASSESSMENT — PAIN DESCRIPTION - LOCATION
LOCATION: ANKLE

## 2023-03-20 ASSESSMENT — PAIN DESCRIPTION - ORIENTATION
ORIENTATION: LEFT

## 2023-03-20 ASSESSMENT — PAIN DESCRIPTION - FREQUENCY
FREQUENCY: CONTINUOUS

## 2023-03-20 ASSESSMENT — PAIN DESCRIPTION - DESCRIPTORS
DESCRIPTORS: ACHING

## 2023-03-20 ASSESSMENT — PAIN DESCRIPTION - ONSET
ONSET: ON-GOING

## 2023-03-20 ASSESSMENT — PAIN DESCRIPTION - PAIN TYPE
TYPE: SURGICAL PAIN

## 2023-03-20 ASSESSMENT — ENCOUNTER SYMPTOMS: SHORTNESS OF BREATH: 1

## 2023-03-20 ASSESSMENT — LIFESTYLE VARIABLES: SMOKING_STATUS: 1

## 2023-03-20 NOTE — PROGRESS NOTES
P[facility]U [facility]    Vitals:    03/20/23 0938   BP: (!) 115/95   Pulse: 96   Resp: 17   Temp: 97.9 °F (36.6 °C)   SpO2: 94%         Intake/Output Summary (Last 24 hours) at 3/20/2023 0939  Last data filed at 3/20/2023 0854  Gross per 24 hour   Intake 1350 ml   Output --   Net 1350 ml       Pain assessment:  present - adequately treated  Pain Level: 4    Patient transferred to care of CAMPBELL RN.    3/20/2023 9:39 AM
Patient admitted to PACU # 9 from OR at 0901 post open reduction internal fixation left distal tibia per MD Vyas. Attached to PACU monitoring system and report received from anesthesia provider. Patient was reported to be hemodynamically stable during procedure. Patient drowsy on admission. Complaining of 5/10 pain, stating it is tolerable. Splint in place, clean, dry, and intact. Able to move toes. Ice applied. Will continue to monitor.
Patient to phase II, tolerating drink and snack. Denies any needs. Family called to room.
Patient's mother verbalized allergy to aspirin and aspartame, stating they \"caused seizures as a child,\" per Dr Sandra Brady instructions, patient to take aspirin for DVT prevention. This RN discussed with Dr Elena Haines, patient will NOT be taking aspirin upon discharge. Patient and mother aware, verbalized understanding. Crutches and PO antibiotics provided.
Verbal and written discharge instructions were given to the patient and family, patient and family verbalize understanding of discharge instructions including medications orders for home and possible side effects associated with them. Patient instructed and verbalizes understanding to call the doctor listed if they should have any complications or worsening symptoms. Verbalizes understanding about follow-up appointments. D/C IV patient tolerated well, no signs of bleeding. Patient discharged home with all belongings. Out via wheelchair.
fingers or toes      For your safety, please do not wear any jewelry or body piercing's on the day of surgery. All jewelry must be removed. If you have dentures, they will be removed before going to operating room. For your convenience, we will provide you with a container. If you wear contact lenses or glasses, they will be removed, please bring a case for them. If you have a living will and a durable power of  for healthcare, please bring in a copy. As part of our patient safety program to minimize surgical site infections, we ask you to do the following:    Please notify your surgeon if you develop any illness between         now and the  day of your surgery. This includes a cough, cold, fever, sore throat, nausea,         or vomiting, and diarrhea, etc.   Please notify your surgeon if you experience dizziness, shortness         of breath or blurred vision between now and the time of your surgery. Do not shave your operative site 96 hours prior to surgery. For face and neck surgery, men may use an electric razor 48 hours   prior to surgery. You may shower the night before surgery or the morning of   your surgery with an antibacterial soap. You will need to bring a photo ID and insurance card    [facility] has an onsite pharmacy, would you like to utilize our pharmacy     If you will be staying overnight and use a C-pap machine, please bring   your C-pap to hospital     Our goal is to provide you with excellent care, therefore, visitors will be limited to two(2) in the room at a time so that we may focus on providing this care for you. Please contact pre-admission testing if you have any further questions. [facility] phone number:  [phone] [facility] fax number:  [phone]  Please note these are generalized instructions for all surgical cases, you may be provided with more specific instructions according to your surgery.     C-Difficile

## 2023-03-20 NOTE — DISCHARGE INSTRUCTIONS
Post op instruction:  1- Discharge home  2- Diagnosis Left ankle distal tibia pilon fracture. 3- Non weight bearing left ankle  4- Elevation surgical site, with ice  5- Keep splint dry and clean  6- Follow up in 2 weeks.   7- For DVT prophylaxis- Aspirin 325 mg daily     Thad Kovacs MD, 3/20/2023

## 2023-03-20 NOTE — ANESTHESIA POSTPROCEDURE EVALUATION
Department of Anesthesiology  Postprocedure Note    Patient: Kennedi Hanley  MRN: [id number]  YOB: 1980  Date of evaluation: 3/20/2023      Procedure Summary     Date: 03/20/23 Room / Location: [address]    Anesthesia Start: 8108 Anesthesia Stop: 8373    Procedure: OPEN REDUCTION INTERNAL FIXATION LEFT DISTAL TIBIA (Left: Ankle) Diagnosis:       Closed displaced pilon fracture of left tibia, initial encounter      (LEFT TIBIAL PILON FRACTURE)    Surgeons: Lakeisha Ratliff MD Responsible Provider: Guille Cameron MD    Anesthesia Type: general ASA Status: 2          Anesthesia Type: No value filed.     Nahomi Phase I: Nahomi Score: 10    Nahomi Phase II: Nahomi Score: 10      Anesthesia Post Evaluation    Patient location during evaluation: bedside  Patient participation: complete - patient participated  Level of consciousness: awake and alert  Pain score: 4  Nausea & Vomiting: no nausea  Complications: no  Cardiovascular status: hemodynamically stable  Respiratory status: acceptable  Hydration status: stable

## 2023-03-20 NOTE — BRIEF OP NOTE
Brief Postoperative Note      Patient: Laureano Oconnell  YOB: 1980  MRN: [id number]    Date of Procedure: 3/20/2023    Pre-Op Diagnosis: LEFT TIBIAL PILON FRACTURE    Post-Op Diagnosis: Same       Procedure(s):  OPEN REDUCTION INTERNAL FIXATION LEFT DISTAL TIBIA    Surgeon(s):  Kayla Recio MD    Assistant: Tressa Jenkins CNP    Anesthesia: General    Estimated Blood Loss (mL): Minimal    Complications: None    Specimens:   * No specimens in log *    Implants:  Implant Name Type Inv. Item Serial No.  Lot No. LRB No. Used Action   PLATE DISTL TIB ANTERIOR 3HOLE - [id number]  PLATE DISTL TIB ANTERIOR 3HOLE  [employer]  Left 1 Implanted   KREULOCK COMPRESSION SCREW SS 3.5X40MM - [id number]  KREULOCK COMPRESSION SCREW SS 3.5X40MM  [employer]  Left 1 Implanted   SCREW BNE L30MM DIA3.5MM WALLY ANK S STL NONLOCKING LO PROF - [id number]  SCREW BNE L30MM DIA3.5MM WALLY ANK S STL NONLOCKING LO PROF  [employer]  Left 1 Implanted   SCREW BNE L28MM DIA3.5MM WALLY ANK S STL NONLOCKING LO PROF - [id number]  SCREW BNE L28MM DIA3.5MM WALLY ANK S STL NONLOCKING LO PROF  [employer]  Left 1 Implanted   SCREW CORTICAL 2.7MM X 48MM - [id number]  SCREW CORTICAL 2.7MM X 48MM  [employer]  Left 1 Implanted   KREULOCK COMPRESSION SCREW SS 2.7X44MM - [id number]  KREULOCK COMPRESSION SCREW SS 2.7X44MM  [employer]  Left 2 Implanted   KREULOCK COMPRESSION SCREW SS 2.7X42MM - [id number]  KREULOCK COMPRESSION SCREW SS 2.7X42MM  [employer]  Left 1 Implanted   KREULOCK COMPRESSION SCREW SS 2.7X46MM - [id number]  KREULOCK COMPRESSION SCREW SS 2.7X46MM  [employer]  Left 3 Implanted         Drains: * No LDAs found *    Findings: Same.     Electronically signed by Kayla Recio MD on 3/20/2023 at 1:26 PM

## 2023-03-20 NOTE — TELEPHONE ENCOUNTER
Justa Dean. Notified that [facility] will follow for PT/OT and that our office can call back tomorrow regarding his restrictions/WB status after surgery. Op not yet complete.

## 2023-03-20 NOTE — H&P
03/20/23 0634   BP: 121/75   Pulse:    Resp:    Temp:    SpO2:      Airway is intact  Chest: chest clear, no wheezing, rales, normal symmetric air entry, no tachypnea, retractions or cyanosis  Heart: regular rate and rhythm ; heart sounds normal  Findings on exam of the body region where surgery is to be performed include:  Left ankle distal tibia pilon fracture.     Electronically signed by Smooth Perales MD on 3/20/2023 at 7:24 AM

## 2023-03-20 NOTE — TELEPHONE ENCOUNTER
[place], [facility], [phone]. Patient discharged from [facility] on 3/16/23. Needs verbal orders for PT and OT.

## 2023-03-20 NOTE — CONSULTS
for Pain for up to 7 days. Intended supply: 7 days. Take lowest dose possible to manage pain Max Daily Amount: 30 mg 3/16/23 3/23/23  HARPREET Scott - CNP   allopurinol (ZYLOPRIM) 300 MG tablet TAKE ONE BY MOUTH DAILY 11/10/21   Historical Provider, MD   colchicine (COLCRYS) 0.6 MG tablet TAKE ONE TABLET (0.6 MG TOTAL) BY MOUTH DAILY. INDICATIONS: ACUTE GOUTY ARTHRITIS 11/10/21   Historical Provider, MD   hydroxychloroquine (PLAQUENIL) 200 MG tablet TAKE ONE BY MOUTH TWICE DAILY 11/7/16   Historical Provider, MD   losartan (COZAAR) 50 MG tablet Take by mouth daily 8/20/21   Historical Provider, MD   PredniSONE 5 MG TBEC Take 30 mg by mouth daily  Patient taking differently: Take 5 mg by mouth daily 11/25/15   Julio Santana MD       Current Medications:   No current facility-administered medications for this encounter.     Allergies:  Vitamin d, Aspartame and phenylalanine, Aspirin, and Magnesium    Social History     Socioeconomic History    Marital status: Single     Spouse name: Not on file    Number of children: 0    Years of education: Not on file    Highest education level: Not on file   Occupational History    Not on file   Tobacco Use    Smoking status: Every Day     Packs/day: 2.00     Years: 15.00     Pack years: 30.00     Types: Cigarettes    Smokeless tobacco: Not on file   Vaping Use    Vaping Use: Never used   Substance and Sexual Activity    Alcohol use: Yes     Comment: occas    Drug use: Not Currently     Types: Marijuana Hortencia Gonzalez)    Sexual activity: Defer   Other Topics Concern    Not on file   Social History Narrative    Not on file     Social Determinants of Health     Financial Resource Strain: Not on file   Food Insecurity: Not on file   Transportation Needs: Not on file   Physical Activity: Not on file   Stress: Not on file   Social Connections: Not on file   Intimate Partner Violence: Not on file   Housing Stability: Not on file       Family History:  Family History   Problem Relation

## 2023-03-20 NOTE — OP NOTE
Operative Note      Patient: Laureano Oconnell  YOB: 1980  MRN: [id number]    Date of Procedure: 3/15/2023    Pre-Op Diagnosis: S82.892A LEFT ANKLE FRACTURE    Post-Op Diagnosis: Same       Procedure(s):  OPEN REDUCTION INTERNAL FIXATION LEFT ANKLE-SYNTHES    Surgeon(s):  Homar Martinez MD    Assistant:   Surgical Assistant: Miki Clark    Anesthesia: General    Estimated Blood Loss (mL): Minimal    Complications: None    Specimens:   * No specimens in log *    Implants:  Implant Name Type Inv.  Item Serial No.  Lot No. LRB No. Used Action   PLATE BNE [id number] 5 H L DST LAT FIBULAR S STL ABDIRIZAK COMPR FOR - [id number]  PLATE BNE [id number] 5 H L DST LAT FIBULAR S STL ABDIRIZAK COMPR FOR  [employer] [place]-  Left 1 Implanted   SCREW BNE L18MM DIA2.7MM WALLY S STL ST ABDIRIZAK FULL THRD T8 - [id number]  SCREW BNE L18MM DIA2.7MM WALLY S STL ST ABDIRIZAK FULL THRD T8  DEPU[employer] SYNTH[employer] [place]-WD  Left 1 Implanted   SCREW BNE L20MM DIA2.7MM WALLY S STL ST ABDIRIZAK FULL THRD T8 - [id number]  SCREW BNE L20MM DIA2.7MM WALLY S STL ST ABDIRIZAK FULL THRD T8  DE[employer] [place]WD  Left 1 Implanted   SCREW BNE L16MM DIA2.7MM WALLY S STL ST ABDIRIZAK FULL THRD T8 - [id number]  SCREW BNE L16MM DIA2.7MM WALLY S STL ST ABDIRIZAK FULL THRD T8  DEPU[employer] [place]-WD  Left 1 Implanted   SCREW BNE L14MM DIA2.7MM WALLY S STL ST ABDIRIZAK FULL THRD T8 - [id number]  SCREW BNE L14MM DIA2.7MM WALLY S STL ST ABDIRIZAK FULL THRD T8  DEPU[employer]-WD  Left 1 Implanted   SCREW BNE L22MM DIA2.7MM WALLY S STL ST ABDIRIZAK FULL THRD T8 - [id number]  SCREW BNE L22MM DIA2.7MM WALLY S STL ST ABDIRIZAK FULL THRD T8  DEPU[employer] [place]-WD  Left 1 Implanted   SCREW BNE L16MM DIA3.5MM WALLY S STL ST NONCANNULATED ABDIRIZAK - [id number]  SCREW BNE L16MM DIA3.5MM WALLY S STL ST NONCANNULATED ABDIRIZAK  [employer] [place]-  Left 1 Implanted   SCREW BNE L50MM DIA4MM S STL TRUMAN LNG HALF THRD SM HEX SOCK - [id number]  SCREW BNE L50MM DIA4MM S STL TRUMAN LNG HALF THRD SM HEX SOCK  DEPU[employer] SYNTHES [place]-WD  Left 2 Implanted

## 2023-03-20 NOTE — ANESTHESIA PRE PROCEDURE
Department of Anesthesiology  Preprocedure Note       Name:  Marisela Dia   Age:  40 y.o.  :  1980                                          MRN:  [id number]         Date:  3/20/2023      Surgeon: Urban Gillespie):  Eunice Martin MD    Procedure: Procedure(s):  OPEN REDUCTION INTERNAL FIXATION LEFT DISTAL TIBIA    Medications prior to admission:   Prior to Admission medications    Medication Sig Start Date End Date Taking? Authorizing Provider   nicotine (NICODERM CQ) 14 MG/24HR Place 1 patch onto the skin daily  Patient not taking: Reported on 3/20/2023 3/17/23   HARPREET Allan CNP   oxyCODONE (ROXICODONE) 5 MG immediate release tablet Take 1 tablet by mouth every 4 hours as needed for Pain for up to 7 days. Intended supply: 7 days. Take lowest dose possible to manage pain Max Daily Amount: 30 mg 3/16/23 3/23/23  HARPREET Richardson CNP   allopurinol (ZYLOPRIM) 300 MG tablet TAKE ONE BY MOUTH DAILY 11/10/21   Historical Provider, MD   colchicine (COLCRYS) 0.6 MG tablet TAKE ONE TABLET (0.6 MG TOTAL) BY MOUTH DAILY. INDICATIONS: ACUTE GOUTY ARTHRITIS 11/10/21   Historical Provider, MD   hydroxychloroquine (PLAQUENIL) 200 MG tablet TAKE ONE BY MOUTH TWICE DAILY 16   Historical Provider, MD   losartan (COZAAR) 50 MG tablet Take by mouth daily 21   Historical Provider, MD   PredniSONE 5 MG TBEC Take 30 mg by mouth daily  Patient taking differently: Take 5 mg by mouth daily 11/25/15   Mike Fall MD       Current medications:    No current facility-administered medications for this visit. No current outpatient medications on file. Facility-Administered Medications Ordered in Other Visits   Medication Dose Route Frequency Provider Last Rate Last Admin    ceFAZolin (ANCEF) 2,000 mg in sodium chloride 0.9 % 50 mL IVPB (mini-bag)  2,000 mg IntraVENous Once Eunice Martin MD           Allergies:     Allergies   Allergen Reactions    Vitamin D Other (See Comments)     Due to

## 2023-03-20 NOTE — OP NOTE
[address]                                OPERATIVE REPORT    PATIENT NAME: Mary Wick                     :        1980  MED REC NO:   [id number]                          ROOM:  ACCOUNT NO:   [de-identified]                           ADMIT DATE: 2023  PROVIDER:     Negin Luque MD    DATE OF PROCEDURE:  2023    PREOPERATIVE DIAGNOSIS:  Left distal tibia intraarticular pilon  displaced fracture. POSTOPERATIVE DIAGNOSIS:  Left distal tibia intraarticular pilon  displaced fracture. OPERATION PERFORMED:  Open treatment of left distal tibia pilon fracture  with open reduction and internal fixation of the tibia only. SURGEON:  Negin Luque MD    ASSISTANT:  Jean Barrios CNP    ANESTHESIA:  General anesthesia. ESTIMATED BLOOD LOSS:  Minimal.    COMPLICATIONS:  None. TOURNIQUET:  Left upper thigh, 350 mmHg. IMPLANTS USED:  Arthrex anterior distal tibia locking plate with  combination of locking and nonlocking screws. INDICATION:  This is a 36-year-old white male who sustained injury to  his left ankle on 03/15/2023. He sustained a trimalleolar comminuted  fracture dislocation. He underwent surgical treatment at that time by  my partner Dr. Jelani Petit. The surgery went well and while the patient in  recovery he started kicking and hitting his left ankle and because of  increasing pain had an x-ray as well as CT scan that confirmed he has a  new distal tibia pilon fracture. All risks, benefits and alternatives  were discussed with the patient. He agreed to proceed with surgical  fixation. Given the patient's Body mass index is 35 kg/m². added significant challenge to the procedure. It required significant physical and mental effort. It required 60% more time for such procedure. OPERATIVE PROCEDURE:  The patient's left ankle was marked.   He received  2 gm Ancef IV

## 2023-03-21 DIAGNOSIS — Z87.81 S/P ORIF (OPEN REDUCTION INTERNAL FIXATION) FRACTURE: ICD-10-CM

## 2023-03-21 DIAGNOSIS — S82.872A CLOSED DISPLACED PILON FRACTURE OF LEFT TIBIA, INITIAL ENCOUNTER: Primary | ICD-10-CM

## 2023-03-21 DIAGNOSIS — Z98.890 S/P ORIF (OPEN REDUCTION INTERNAL FIXATION) FRACTURE: ICD-10-CM

## 2023-03-21 RX ORDER — OXYCODONE HYDROCHLORIDE AND ACETAMINOPHEN 5; 325 MG/1; MG/1
1 TABLET ORAL EVERY 6 HOURS PRN
Qty: 20 TABLET | Refills: 0 | Status: SHIPPED | OUTPATIENT
Start: 2023-03-21 | End: 2023-03-26

## 2023-03-21 NOTE — TELEPHONE ENCOUNTER
advised that percocet may be refilled. He is aware of the early request. Insurance may not cover due to early fill though.

## 2023-03-21 NOTE — TELEPHONE ENCOUNTER
Prescription Refill     Medication Name:  oxyCODONE (ROXICODONE) 5 MG immediate release tablet    Pharmacy: [address], [address]  [address][phone]    Patient Contact Number:  [phone]     Patient request refill

## 2023-03-27 ENCOUNTER — TELEPHONE (OUTPATIENT)
Dept: ORTHOPEDIC SURGERY | Age: 43
End: 2023-03-27

## 2023-04-04 ENCOUNTER — OFFICE VISIT (OUTPATIENT)
Dept: ORTHOPEDIC SURGERY | Age: 43
End: 2023-04-04
Payer: COMMERCIAL

## 2023-04-04 VITALS — WEIGHT: 264 LBS | HEIGHT: 73 IN | BODY MASS INDEX: 34.99 KG/M2

## 2023-04-04 DIAGNOSIS — Z87.81 S/P ORIF (OPEN REDUCTION INTERNAL FIXATION) FRACTURE: Primary | ICD-10-CM

## 2023-04-04 DIAGNOSIS — Z98.890 S/P ORIF (OPEN REDUCTION INTERNAL FIXATION) FRACTURE: Primary | ICD-10-CM

## 2023-04-04 DIAGNOSIS — S82.872A CLOSED DISPLACED PILON FRACTURE OF LEFT TIBIA, INITIAL ENCOUNTER: ICD-10-CM

## 2023-04-04 DIAGNOSIS — S82.892A ANKLE FRACTURE, LEFT, CLOSED, INITIAL ENCOUNTER: ICD-10-CM

## 2023-04-04 PROCEDURE — L4361 PNEUMA/VAC WALK BOOT PRE OTS: HCPCS | Performed by: NURSE PRACTITIONER

## 2023-04-04 PROCEDURE — APPNB30 APP NON BILLABLE TIME 0-30 MINS: Performed by: NURSE PRACTITIONER

## 2023-04-04 PROCEDURE — 99024 POSTOP FOLLOW-UP VISIT: CPT | Performed by: NURSE PRACTITIONER

## 2023-04-04 NOTE — PROGRESS NOTES
DIAGNOSIS:    1-Left distal tibia intra-articular pilon displaced fracture, status post ORIF tibia only. 2-Left trimalleolar comminuted fracture dislocation, status post ORIF medial and lateral malleolus, 3/15/2023 by Dr. Gurrola Beam: 3/20/2023. HISTORY OF PRESENT ILLNESS:  Mr. Roper Stake 40 y.o.  male who came in today for 2 weeks postoperative visit. The patient denies any significant pain in the left ankle. Rates pain a 1/10 VAS mild, aching, intermittent and are improving. Aggravating factors movement. Alleviating factors elevation and rest. He has been in a splint, and non WB. No numbness or tingling sensation. No fever or Chills. He works as an  and has been off work. PHYSICAL EXAMINATION:  The incision healing well. Sutures intact medial and lateral ankle. Steri-Strips intact anterior ankle. No signs of any erythema or drainage, minimal swelling. He has no pain with the active or passive range of motion of the left ankle, but decrease ROM. He has intact sensation distally, and he is neurovascularly intact. IMAGING:  Three views left ankle taken today in the office showed anatomic alignment of the fracture, anterior tibia and lateral malleolus plate and screws in good position, no loosening. Ankle mortise is well centered. IMPRESSION:  2 weeks out from   1-Left distal tibia intra-articular pilon displaced fracture, status post ORIF tibia only. 2-Left trimalleolar comminuted fracture dislocation, status post ORIF medial and lateral malleolus, 3/15/2023 by Dr. Serrano Dad: He placed in a boot, and non WB for 6 to 8 weeks. I have told the patient to work on ROM. Continue DVT prophylaxis. Rest, ice and elevate. I discussed with the patient the risk and benefits of suture removal and he agreed to proceed. The patient will come back for a follow up in 6 weeks. At that time, we will take 3 views of the left ankle standing. Off work for 6 weeks.

## 2023-05-09 ENCOUNTER — TELEPHONE (OUTPATIENT)
Dept: ORTHOPEDIC SURGERY | Age: 43
End: 2023-05-09

## 2023-05-16 ENCOUNTER — TELEPHONE (OUTPATIENT)
Dept: ORTHOPEDIC SURGERY | Age: 43
End: 2023-05-16

## 2023-05-16 ENCOUNTER — OFFICE VISIT (OUTPATIENT)
Dept: ORTHOPEDIC SURGERY | Age: 43
End: 2023-05-16

## 2023-05-16 VITALS — HEIGHT: 73 IN | BODY MASS INDEX: 34.99 KG/M2 | WEIGHT: 264 LBS

## 2023-05-16 DIAGNOSIS — Z87.81 S/P ORIF (OPEN REDUCTION INTERNAL FIXATION) FRACTURE: Primary | ICD-10-CM

## 2023-05-16 DIAGNOSIS — S82.872A CLOSED DISPLACED PILON FRACTURE OF LEFT TIBIA, INITIAL ENCOUNTER: ICD-10-CM

## 2023-05-16 DIAGNOSIS — Z98.890 S/P ORIF (OPEN REDUCTION INTERNAL FIXATION) FRACTURE: Primary | ICD-10-CM

## 2023-05-16 PROCEDURE — 99024 POSTOP FOLLOW-UP VISIT: CPT | Performed by: NURSE PRACTITIONER

## 2023-05-16 PROCEDURE — APPNB30 APP NON BILLABLE TIME 0-30 MINS: Performed by: NURSE PRACTITIONER

## 2023-05-16 NOTE — PROGRESS NOTES
time, we will take 3 views of the left ankle standing. Off work for 6 weeks.       Tye Lujan, HARPREET - CNP

## 2023-05-25 ENCOUNTER — HOSPITAL ENCOUNTER (OUTPATIENT)
Dept: PHYSICAL THERAPY | Age: 43
Setting detail: THERAPIES SERIES
Discharge: HOME | End: 2023-05-25
Payer: COMMERCIAL

## 2023-05-25 PROCEDURE — 97161 PT EVAL LOW COMPLEX 20 MIN: CPT

## 2023-05-25 PROCEDURE — 97110 THERAPEUTIC EXERCISES: CPT

## 2023-05-25 NOTE — FLOWSHEET NOTE
[address] [place] [address]  Phone: [phone]   Fax: [phone]    Physical Therapy Daily Treatment Note    Date:  2023     Patient Name:  Stephany Valdes    :  1980  MRN: [id number]  Medical Diagnosis:  S/P ORIF (open reduction internal fixation) fracture [Z98.890, Z87.81]  Closed displaced pilon fracture of left tibia, initial encounter [A88.693J]  Treatment Diagnosis: L foot/ankle edema, decreased strength, ROM, balance, antalgic gait    Insurance/Certification information:  PT Insurance Information: [employer] through [employer], no copay  Physician Information:  HARPREET Jansen -*    Plan of care signed (Y/N): []  Yes [x]  No     Date of Patient follow up with Physician:      Progress Report: []  Yes  [x]  No     Date Range for reporting period:  Beginnin2023  Ending:     Progress report due (10 Rx/or 30 days whichever is less): visit #10 or     Recertification due (POC duration/ or 90 days whichever is less): visit #TBD or TBD (date)     Visit # Insurance Allowable Auth required?  Date Range   1/TBD [place] [x]  Yes  []  No 23-       Units approved Units used Date Range   - - -     Latex Allergy:  [x]NO      []YES  Preferred Language for Healthcare:   [x]English       []other:    Functional Scale:       Date assessed:  LEFS: raw score = 28; dysfunction = 65%  23    Pain level:  1-2/10 w/ CAM boot     SUBJECTIVE:  See eval    OBJECTIVE: See eval      RESTRICTIONS/PRECAUTIONS: Left trimalleolar comminuted fracture dislocation 3/20/23 by Dr. Katherine Last, status post ORIF medial and lateral malleolus, 3/15/2023 by Dr. Linda Gage until 23    Exercises/Interventions:     Therapeutic Exercise (80449)  Resistance / level Sets/sec Reps Notes / Cues   bike       Gastroc/soleus S w/ strap    HEP   Ankle 4 way orange 1 10 ea : HEP   Seated HR/TR       Board cw/ccw, a/p, m/l       Ankle INV/EV towel       Ankle AROM circles       Toe

## 2023-05-25 NOTE — PLAN OF CARE
[address] [address], [address] [place] [address] Drive  Phone: [phone]   Fax: [phone]                                                       Physical Therapy Certification    Dear HARPREET Montemayor -*  ,    We had the pleasure of evaluating the following patient for physical therapy services at [address] [place] [address]. A summary of our findings can be found in the initial assessment below. This includes our plan of care. If you have any questions or concerns regarding these findings, please do not hesitate to contact me at the office phone number checked above. Thank you for the referral.       Physician Signature:_______________________________Date:__________________  By signing above (or electronic signature), therapists plan is approved by physician      Patient: Nba Day   : 1980   MRN: [id number]  Referring Physician: HARPREET Montemayor -*        Evaluation Date: 2023      Medical Diagnosis Information:  S/P ORIF (open reduction internal fixation) fracture [Z98.890, Z87.81]  Closed displaced pilon fracture of left tibia, initial encounter [S67.619Z]   PT diagnosis: L foot/ankle edema, decreased strength, ROM, balance, antalgic gait                                      Insurance information: PT Insurance Information: Kriss [employer] through [employer], no copay     Precautions/ Contra-indications: WBAT until 6/6 in CAM boot on L  Latex Allergy:  [x]NO      []YES  Preferred Language for Healthcare:   [x]English       []Other:    C-SSRS Triggered by Intake questionnaire (Past 2 wk assessment ):   [x] No, Questionnaire did not trigger screening.   [] Yes, Patient intake triggered C-SSRS Screening     [] Completed, no further action required.    [] Completed, PCP notified via Epic    SUBJECTIVE: Patient stated complaint: pt s/p 1-Left distal tibia intra-articular

## 2023-06-01 ENCOUNTER — HOSPITAL ENCOUNTER (OUTPATIENT)
Dept: PHYSICAL THERAPY | Age: 43
Setting detail: THERAPIES SERIES
Discharge: HOME | End: 2023-06-01
Payer: COMMERCIAL

## 2023-06-01 PROCEDURE — 97530 THERAPEUTIC ACTIVITIES: CPT

## 2023-06-01 PROCEDURE — 97110 THERAPEUTIC EXERCISES: CPT

## 2023-06-01 NOTE — FLOWSHEET NOTE
Adjusted  3. Patient will demonstrate an increase in L foot/ankle Strength to at least 4/5 as well as good proximal hip strength and control to allow for proper functional mobility as indicated by patients Functional Deficits. [] Progressing: [] Met: [] Not Met: [] Adjusted  4. Patient will return to functional activities including ascending/descending at least 10 stairs with reciprocal gait pattern, one handrail independently without increased symptoms or restriction. [] Progressing: [] Met: [] Not Met: [] Adjusted  5. Patient will be able to climb a ladder and walk for at least 30 minutes per pt report to demo ability to return to work. [] Progressing: [] Met: [] Not Met: [] Adjusted     Overall Progression Towards Functional goals/ Treatment Progress Update:  [] Patient is progressing as expected towards functional goals listed. [] Progression is slowed due to complexities/Impairments listed. [] Progression has been slowed due to co-morbidities. [x] Plan just implemented, too soon to assess goals progression <30days   [] Goals require adjustment due to lack of progress  [] Patient is not progressing as expected and requires additional follow up with physician  [] Other    Persisting Functional Limitations/Impairments:  [x]Sitting [x]Standing   [x]Walking [x]Stairs   [x]Transfers [x]ADLs   [x]Squatting/bending [x]Kneeling  [x]Housework [x]Job related tasks  [x]Driving []Sports/Recreation   []Sleeping []Other:    ASSESSMENT:  Session progressed off of last session. Added weight to inversion and eversion slides. Progressed to include airex stomp as well as standing heel raises with eccentric focus. Added the anti gravity treadmill for un-weighted gait mechanics. Noted improvements in symmetry at 70%. Karyle Bream still noted pain with ambulation in the alter-G, but more manageable as well as noted improved ROM and activation.  Will cont to benefit from skilled PT interventions to progress to

## 2023-06-08 ENCOUNTER — HOSPITAL ENCOUNTER (OUTPATIENT)
Dept: PHYSICAL THERAPY | Age: 43
Setting detail: THERAPIES SERIES
Discharge: HOME | End: 2023-06-08
Payer: COMMERCIAL

## 2023-06-08 NOTE — PROGRESS NOTES
[address]     Physical Therapy  Cancellation/No-show Note  Patient Name:  Shannan Solo  :  1980   Date:  2023  Cancelled visits to date: 1  No-shows to date: 0    Patient status for today's appointment patient:  []  Cancelled  [x]  Rescheduled appointment  []  No-show     Reason given by patient:  []  Patient ill  []  Conflicting appointment  []  No transportation    []  Conflict with work  []  No reason given  [x]  Other:     Comments:  Auth Pending Review    Phone call information:   []  Phone call made today to patient at _ time at number provided:      []  Patient answered, conversation as follows:    []  Patient did not answer, message left as follows:  []  Phone call not made today  []  Phone call not needed - pt contacted us to cancel and provided reason for cancellation.      Electronically signed by:  Torey Vitale PT DPT, MS

## 2023-06-22 ENCOUNTER — HOSPITAL ENCOUNTER (OUTPATIENT)
Dept: PHYSICAL THERAPY | Age: 43
Setting detail: THERAPIES SERIES
Discharge: HOME | End: 2023-06-22
Payer: COMMERCIAL

## 2023-06-22 NOTE — PROGRESS NOTES
[address]     Physical Therapy  Cancellation/No-show Note  Patient Name:  Jo Grullon  :  1980   Date:  2023  Cancelled visits to date: 1  No-shows to date: 2    Patient status for today's appointment patient:  []  Cancelled  []  Rescheduled appointment  [x]  No-show:      Reason given by patient:  []  Patient ill  []  Conflicting appointment  []  No transportation    []  Conflict with work  []  No reason given  []  Other:     Comments:  Auth Pending Review    Phone call information:   []  Phone call made today to patient at _3:24pm:  time at number provided:      []  Patient answered, conversation as follows:    []  Patient did not answer, message left as follows: Left message reporting that authorization was approved for 24 visits for the full year. [x]  Phone call not made today  []  Phone call not needed - pt contacted us to cancel and provided reason for cancellation.      Electronically signed by:  Aster Callejas PT DPT, OMT-C

## 2023-06-27 ENCOUNTER — OFFICE VISIT (OUTPATIENT)
Dept: ORTHOPEDIC SURGERY | Age: 43
End: 2023-06-27

## 2023-06-27 ENCOUNTER — TELEPHONE (OUTPATIENT)
Dept: ORTHOPEDIC SURGERY | Age: 43
End: 2023-06-27

## 2023-06-27 VITALS — BODY MASS INDEX: 34.99 KG/M2 | HEIGHT: 73 IN | WEIGHT: 264 LBS

## 2023-06-27 DIAGNOSIS — Z98.890 S/P ORIF (OPEN REDUCTION INTERNAL FIXATION) FRACTURE: Primary | ICD-10-CM

## 2023-06-27 DIAGNOSIS — S82.872A CLOSED DISPLACED PILON FRACTURE OF LEFT TIBIA, INITIAL ENCOUNTER: ICD-10-CM

## 2023-06-27 DIAGNOSIS — Z87.81 S/P ORIF (OPEN REDUCTION INTERNAL FIXATION) FRACTURE: Primary | ICD-10-CM

## 2023-07-11 ENCOUNTER — HOSPITAL ENCOUNTER (OUTPATIENT)
Dept: PHYSICAL THERAPY | Age: 43
Setting detail: THERAPIES SERIES
End: 2023-07-11
Payer: COMMERCIAL

## 2023-07-18 ENCOUNTER — HOSPITAL ENCOUNTER (OUTPATIENT)
Dept: PHYSICAL THERAPY | Age: 43
Setting detail: THERAPIES SERIES
Discharge: HOME | End: 2023-07-18
Payer: COMMERCIAL

## 2023-07-18 PROCEDURE — 97530 THERAPEUTIC ACTIVITIES: CPT

## 2023-07-18 PROCEDURE — 97110 THERAPEUTIC EXERCISES: CPT

## 2023-07-18 NOTE — FLOWSHEET NOTE
RESTRICTIONS/PRECAUTIONS: Left trimalleolar comminuted fracture dislocation 3/20/23 by Dr. Joyce Fisher, status post ORIF medial and lateral malleolus, 3/15/2023 by Dr. Santi Milan until 6/6/23    Exercises/Interventions:     Therapeutic Exercise (08601)  Resistance / level Sets/sec Reps Notes / Cues   bike  5'     Gastroc/soleus S w/ strap  30\" 4 HEP   Ankle 4 way orange 1 10 ea 5/25: HEP   Standing DL 2 to 1 ecc  2 10 7/18   Standing DL Heel Raise  2 10 6/1   Seated HR/TR  2 10    Board cw/ccw, a/p, m/l  1 20e Large disc   Ankle INV/EV towel       Ankle AROM circles    ABC lower/ Large   Toe curls       Self DF mob  1 20    Airex stomp  10' 10 Noted pop in ankle c relief                        Gait (38736)                     Therapeutic Activities (77304)       Alter G  10'  XL pants. 70% 1.5 mph cues to push through toes. Assess: LEFS, ROM, goals  15'  7/18                 Neuromuscular Re-ed (29359)       Airex Stomp  10\" 10                  Manual Intervention (17541)       Knee mobs/PROM       Tib/Fem Mobs       Patella Mobs       Ankle mobs       Light Midfoot and rearfoot mobs  8' Grade 1-2    Lunge functional DF mob  4'         Modalities:     Pt. Education:  05/25/2023  -pt educated on diagnosis, prognosis and expectations for rehab  -all pt questions were answered    Home Exercise Program:  Access Code: [email or web address]  URL: [email or web address]  Date: 05/25/2023  Prepared by: Camila Rodríguez    Exercises  - Long Sitting Calf Stretch with Strap  - 1 x daily - 7 x weekly - 3 sets - 10 reps  - Long Sitting Ankle Plantar Flexion with Resistance  - 1 x daily - 7 x weekly - 1-3 sets - 10 reps  - Long Sitting Ankle Eversion with Resistance  - 1 x daily - 7 x weekly - 1-3 sets - 10 reps  - Long Sitting Ankle Inversion with Resistance  - 1 x daily - 7 x weekly - 1-3 sets - 10 reps  - Long Sitting Ankle Dorsiflexion with Anchored Resistance  - 1 x daily - 7 x weekly - 1-3 sets - 10 reps  - Supine Ankle Pumps

## 2023-07-25 ENCOUNTER — HOSPITAL ENCOUNTER (OUTPATIENT)
Dept: PHYSICAL THERAPY | Age: 43
Setting detail: THERAPIES SERIES
Discharge: HOME | End: 2023-07-25
Payer: COMMERCIAL

## 2023-07-25 PROCEDURE — 97110 THERAPEUTIC EXERCISES: CPT

## 2023-07-25 PROCEDURE — 97140 MANUAL THERAPY 1/> REGIONS: CPT

## 2023-07-25 NOTE — FLOWSHEET NOTE
[address] [facility]  Phone: [phone]   Fax: [phone]    Physical Therapy Daily Treatment Note    Date:  2023     Patient Name:  Ayden Theodore    :  1980  MRN: [id number]  Medical Diagnosis:  S/P ORIF (open reduction internal fixation) fracture [Z98.890, Z87.81]  Closed displaced pilon fracture of left tibia, initial encounter [I51.990S]  Treatment Diagnosis: L foot/ankle edema, decreased strength, ROM, balance, antalgic gait    Insurance/Certification information:  PT Insurance Information: Gordon [employer] through [employer], no copay  Physician Information:  HARPREET Garcia -*    Plan of care signed (Y/N): []  Yes [x]  No     Date of Patient follow up with Physician:      Progress Report: [x]  Yes  []  No     Date Range for reporting period:  Beginnin2023  Ending:     Progress report due (10 Rx/or 30 days whichever is less): visit #10 or     Recertification due (POC duration/ or 90 days whichever is less): visit #TBD or TBD (date)     Visit # Insurance Allowable Auth required? Date Range   4/TBD [place] (24 [place]  [x]  Yes  []  No 23- 24       Units approved Units used Date Range   - - -     Latex Allergy:  [x]NO      []YES  Preferred Language for Healthcare:   [x]English       []other:    Functional Scale:       Date assessed:  LEFS: raw score = 28; dysfunction = 65%  23  LEFS: raw score = 37; dysfunction = 53.75% 23    Pain level:  1-2/10 w/ CAM boot     SUBJECTIVE:  States that he is doing a little better - however he did mow the grass this last yesterday and his ankle was painful after that. States that he has been wearing the compression sock. LOOK INTO GETTING COLD MACHINE for swelling    OBJECTIVE:   : noted increased DF after step downs    :   ROM more limited than evaluation.  Less pain           PROM AROM     L R L R   Hip Flexion           Hip Abduction           Hip ER           Hip IR

## 2023-07-28 ENCOUNTER — HOSPITAL ENCOUNTER (OUTPATIENT)
Dept: PHYSICAL THERAPY | Age: 43
Setting detail: THERAPIES SERIES
Discharge: HOME | End: 2023-07-28
Payer: COMMERCIAL

## 2023-07-28 PROCEDURE — 97112 NEUROMUSCULAR REEDUCATION: CPT

## 2023-07-28 PROCEDURE — 97110 THERAPEUTIC EXERCISES: CPT

## 2023-07-28 NOTE — FLOWSHEET NOTE
EVAL - MOD (11804)  [] EVAL - HIGH (87141)  [] RE-EVAL (30482)  [x] DU(70409) x  2     [] Ionto  [x] NMR (13336) x 1      [] Vaso  [] Manual (75129) x  1     [] Ultrasound  [] TA x        [] Mech Traction (42340)  [] Aquatic Therapy x     [] ES (un) (16901):   [] Home Management Training x  [] ES(attended) (68782)   [] Dry Needling 1-2 muscles (54038):  [] Dry Needling 3+ muscles (491840  [] Group:      [] Other:     GOALS:  Patient stated goal: \"walk good again\"  [] Progressing: [] Met: [x] Not Met: [] Adjusted    Therapist goals for Patient:   Short Term Goals: To be achieved in: 2 weeks  1. Independent in HEP and progression per patient tolerance, in order to prevent re-injury. [] Progressing: [] Met: [x] Not Met: [] Adjusted  2. Patient will have a decrease in pain to facilitate improvement in movement, function, and ADLs as indicated by Functional Deficits. [] Progressing: [] Met: [x] Not Met: [] Adjusted    Long Term Goals: To be achieved in: 12 weeks or discharge  1. Pt will improve LEFS by 9 points to reduce disability and progress towards PLOF. [x] Progressing: [] Met: [] Not Met: [] Adjusted  2. Patient will demonstrate increased AROM to improve by 5 degrees in all L ankle directions to allow for proper joint functioning as indicated by patients Functional Deficits. [] Progressing: [] Met: [x] Not Met: [] Adjusted  3. Patient will demonstrate an increase in L foot/ankle Strength to at least 4/5 as well as good proximal hip strength and control to allow for proper functional mobility as indicated by patients Functional Deficits. [] Progressing: [] Met: [x] Not Met: [] Adjusted  4. Patient will return to functional activities including ascending/descending at least 10 stairs with reciprocal gait pattern, one handrail independently without increased symptoms or restriction. [] Progressing: [] Met: [x] Not Met: [] Adjusted  5.  Patient will be able to climb a ladder and walk for at least 30 minutes

## 2023-08-01 ENCOUNTER — HOSPITAL ENCOUNTER (OUTPATIENT)
Dept: PHYSICAL THERAPY | Age: 43
Setting detail: THERAPIES SERIES
Discharge: HOME | End: 2023-08-01
Payer: COMMERCIAL

## 2023-08-01 PROCEDURE — 97140 MANUAL THERAPY 1/> REGIONS: CPT

## 2023-08-01 PROCEDURE — 97530 THERAPEUTIC ACTIVITIES: CPT

## 2023-08-01 PROCEDURE — 97110 THERAPEUTIC EXERCISES: CPT

## 2023-08-01 NOTE — FLOWSHEET NOTE
[address]  Phone: [phone]   Fax: [phone]    Physical Therapy Daily Treatment Note    Date:  2023     Patient Name:  Emmanuel Boudreaux    :  1980  MRN: [id number]  Medical Diagnosis:  S/P ORIF (open reduction internal fixation) fracture [Z98.890, Z87.81]  Closed displaced pilon fracture of left tibia, initial encounter [B83.068C]  Treatment Diagnosis: L foot/ankle edema, decreased strength, ROM, balance, antalgic gait    Insurance/Certification information:  PT Insurance Information: [employer] through [employer], no copay  Physician Information:  HARPREET Villanueva -*    Plan of care signed (Y/N): [x]  Yes []  No     Date of Patient follow up with Physician:      Progress Report: []  Yes  [x]  No     Date Range for reporting period:  Beginnin2023  PN: 23  Ending:     Progress report due (10 Rx/or 30 days whichever is less): visit #13 or 3/06/96    Recertification due (POC duration/ or 90 days whichever is less): visit #24 or 23 (date)     Visit # Insurance Allowable Auth required? Date Range    [place] (24 [place]  [x]  Yes  []  No 23-24       Units approved Units used Date Range   - - -     Latex Allergy:  [x]NO      []YES  Preferred Language for Healthcare:   [x]English       []other:    Functional Scale:       Date assessed:  LEFS: raw score = 28; dysfunction = 65%  23  LEFS: raw score = 37; dysfunction = 53.75% 23    Pain level:  2/10      SUBJECTIVE:  Pt reports doing okay. Doing his HEP at home with no issues, just a little pain. States he got the ice machine to help his swelling, just using it at night. OBJECTIVE:    difficulty controlling eccentric phase of double up single down heel raise     : noted increased DF after step downs    :   ROM more limited than evaluation.  Less pain           PROM AROM     L R L R   Hip Flexion           Hip Abduction           Hip ER

## 2023-08-03 ENCOUNTER — HOSPITAL ENCOUNTER (OUTPATIENT)
Dept: PHYSICAL THERAPY | Age: 43
Setting detail: THERAPIES SERIES
End: 2023-08-03
Payer: COMMERCIAL

## 2023-08-08 ENCOUNTER — OFFICE VISIT (OUTPATIENT)
Dept: ORTHOPEDIC SURGERY | Age: 43
End: 2023-08-08
Payer: COMMERCIAL

## 2023-08-08 ENCOUNTER — HOSPITAL ENCOUNTER (OUTPATIENT)
Dept: PHYSICAL THERAPY | Age: 43
Setting detail: THERAPIES SERIES
Discharge: HOME | End: 2023-08-08
Payer: COMMERCIAL

## 2023-08-08 VITALS — HEIGHT: 73 IN | BODY MASS INDEX: 34.99 KG/M2 | WEIGHT: 264 LBS

## 2023-08-08 DIAGNOSIS — Z98.890 S/P ORIF (OPEN REDUCTION INTERNAL FIXATION) FRACTURE: Primary | ICD-10-CM

## 2023-08-08 DIAGNOSIS — S82.872D CLOSED DISPLACED PILON FRACTURE OF LEFT TIBIA WITH ROUTINE HEALING, SUBSEQUENT ENCOUNTER: ICD-10-CM

## 2023-08-08 DIAGNOSIS — S82.892A ANKLE FRACTURE, LEFT, CLOSED, INITIAL ENCOUNTER: ICD-10-CM

## 2023-08-08 DIAGNOSIS — Z87.81 S/P ORIF (OPEN REDUCTION INTERNAL FIXATION) FRACTURE: Primary | ICD-10-CM

## 2023-08-08 PROCEDURE — 97140 MANUAL THERAPY 1/> REGIONS: CPT

## 2023-08-08 PROCEDURE — 99213 OFFICE O/P EST LOW 20 MIN: CPT | Performed by: NURSE PRACTITIONER

## 2023-08-08 PROCEDURE — 97110 THERAPEUTIC EXERCISES: CPT

## 2023-08-08 PROCEDURE — 97112 NEUROMUSCULAR REEDUCATION: CPT

## 2023-08-08 NOTE — PROGRESS NOTES
healing. The importance of smoking cessation for optimal bone and wound healing was stressed. This was communicated verbally, 5 Minutes.         Jatin Amaro, APRN - CNP

## 2023-08-08 NOTE — FLOWSHEET NOTE
[address]  Phone: [phone]   Fax: [phone]    Physical Therapy Daily Treatment Note    Date:  2023     Patient Name:  Shu Pepe    :  1980  MRN: [id number]  Medical Diagnosis:  S/P ORIF (open reduction internal fixation) fracture [Z98.890, Z87.81]  Closed displaced pilon fracture of left tibia, initial encounter [N75.640B]  Treatment Diagnosis: L foot/ankle edema, decreased strength, ROM, balance, antalgic gait    Insurance/Certification information:  PT Insurance Information: Rex Wood through [employer], no copay  Physician Information:  HARPREET Traylor -*    Plan of care signed (Y/N): [x]  Yes []  No     Date of Patient follow up with Physician:      Progress Report: []  Yes  [x]  No     Date Range for reporting period:  Beginnin2023  PN: 23  Ending:     Progress report due (10 Rx/or 30 days whichever is less): visit #13 or     Recertification due (POC duration/ or 90 days whichever is less): visit #24 or 23 (date)     Visit # Insurance Allowable Auth required? Date Range    [place] ([place]  [x]  Yes  []  No 23-24       Units approved Units used Date Range   - - -     Latex Allergy:  [x]NO      []YES  Preferred Language for Healthcare:   [x]English       []other:    Functional Scale:       Date assessed:  LEFS: raw score = 28; dysfunction = 65%  23  LEFS: raw score = 37; dysfunction = 53.75% 23    Pain level:  0/10      SUBJECTIVE:  Pt reports he has been resting more recently so pain level is low. Followed up w/ Angely Hunt his NP right before this visit. 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        OBJECTIVE:   : poor tandem w/ L foot posterior and SLS on floor   difficulty controlling eccentric phase of double up single down heel raise     : noted increased DF after step downs    :   ROM more

## 2023-08-11 ENCOUNTER — HOSPITAL ENCOUNTER (OUTPATIENT)
Dept: PHYSICAL THERAPY | Age: 43
Setting detail: THERAPIES SERIES
Discharge: HOME | End: 2023-08-11
Payer: COMMERCIAL

## 2023-08-11 NOTE — PROGRESS NOTES
[address] [facility]     Physical Therapy  Cancellation/No-show Note  Patient Name:  Joni Talavera  :  1980   Date:  2023  Cancelled visits to date: 2  No-shows to date: 2    Patient status for today's appointment patient:  [x]  Cancelled   []  Rescheduled appointment  []  No-show:      Reason given by patient:  [x]  Patient ill  []  Conflicting appointment  []  No transportation    []  Conflict with work  []  No reason given  []  Other:     Comments:      Phone call information:   []  Phone call made today to patient at :  time at number provided:      []  Patient answered, conversation as follows:    []  Patient did not answer, message left as follows:   []  Phone call not made today  [x]  Phone call not needed - pt contacted us to cancel and provided reason for cancellation. Electronically signed by:   Ori Garrido, PT DPT, ATC

## 2023-08-15 ENCOUNTER — HOSPITAL ENCOUNTER (OUTPATIENT)
Dept: PHYSICAL THERAPY | Age: 43
Setting detail: THERAPIES SERIES
Discharge: HOME | End: 2023-08-15
Payer: COMMERCIAL

## 2023-08-15 PROCEDURE — 97140 MANUAL THERAPY 1/> REGIONS: CPT | Performed by: SPECIALIST/TECHNOLOGIST

## 2023-08-15 PROCEDURE — 97110 THERAPEUTIC EXERCISES: CPT | Performed by: SPECIALIST/TECHNOLOGIST

## 2023-08-15 NOTE — FLOWSHEET NOTE
[address]  Phone: [phone]   Fax: [phone]    Physical Therapy Daily Treatment Note    Date:  08/15/2023     Patient Name:  Lizbeth Al    :  1980  MRN: [id number]  Medical Diagnosis:  S/P ORIF (open reduction internal fixation) fracture [Z98.890, Z87.81]  Closed displaced pilon fracture of left tibia, initial encounter [W16.179V]  Treatment Diagnosis: L foot/ankle edema, decreased strength, ROM, balance, antalgic gait    Insurance/Certification information:  PT Insurance Information: [employer] through [employer], no copay  Physician Information:  HARPREET Ocasio -*    Plan of care signed (Y/N): [x]  Yes []  No     Date of Patient follow up with Physician:      Progress Report: []  Yes  [x]  No     Date Range for reporting period:  Beginnin2023  PN: 23  Ending:     Progress report due (10 Rx/or 30 days whichever is less): visit #13 or 61    Recertification due (POC duration/ or 90 days whichever is less): visit #24 or 23 (date)     Visit # Insurance Allowable Auth required? Date Range    [place]  [x]  Yes  []  No 23-24       Units approved Units used Date Range   - - -     Latex Allergy:  [x]NO      []YES  Preferred Language for Healthcare:   [x]English       []other:    Functional Scale:       Date assessed:  LEFS: raw score = 28; dysfunction = 65%  23  LEFS: raw score = 37; dysfunction = 53.75% 23    Pain level:  1/10  With stairs primarily    SUBJECTIVE:  Pt is 5 months s/p L trimalleolar fx  from 3/20/23 after refracturing his ankle. Pt feels he has pain at times with ambulation. Deana Monsalve feels he will needs a Total Ankle replacement due to OA and cartilage damage. OBJECTIVE:   8/15 poor ambulation with ER foot strike/ Hip rotation and avoiding the DF pushoff/toe phase of  gait pattern with cues, can self correct.  Left quad weakness and Rt side hip soreness noted with

## 2023-08-24 ENCOUNTER — HOSPITAL ENCOUNTER (OUTPATIENT)
Dept: PHYSICAL THERAPY | Age: 43
Setting detail: THERAPIES SERIES
Discharge: HOME | End: 2023-08-24
Payer: COMMERCIAL

## 2023-08-24 NOTE — PROGRESS NOTES
[address] [facility]     Physical Therapy  Cancellation/No-show Note  Patient Name:  Rupali Ely  :  1980   Date:  2023  Cancelled visits to date: 2  No-shows to date: 3    Patient status for today's appointment patient:  []  Cancelled   []  Rescheduled appointment  [x]  No-show: ,      Reason given by patient:  []  Patient ill  []  Conflicting appointment  []  No transportation    []  Conflict with work  []  No reason given  []  Other:     Comments:      Phone call information:   []  Phone call made today to patient at :  time at number provided:      []  Patient answered, conversation as follows:    [x]  Patient did not answer, message left as follows: PT indicated missed visit and reminded pt of next f/u on  at 3:45pm. Provided office number to call to cancel or r/s if desired. []  Phone call not made today  []  Phone call not needed - pt contacted us to cancel and provided reason for cancellation.      Electronically signed by:  Patricio Persaud, PT DPT, OMT-C

## 2023-08-29 ENCOUNTER — HOSPITAL ENCOUNTER (OUTPATIENT)
Dept: PHYSICAL THERAPY | Age: 43
Setting detail: THERAPIES SERIES
Discharge: HOME | End: 2023-08-29
Payer: COMMERCIAL

## 2023-08-29 NOTE — PROGRESS NOTES
[facility]     Physical Therapy  Cancellation/No-show Note  Patient Name:  Lizzie Alberts  :  1980   Date:  2023  Cancelled visits to date: 1  No-shows to date: 3    Patient status for today's appointment patient:  []  Cancelled   []  Rescheduled appointment  [x]  No-show: , ,      Reason given by patient:  []  Patient ill  []  Conflicting appointment  []  No transportation    []  Conflict with work  []  No reason given  []  Other:     Comments:      Phone call information:   []  Phone call made today to patient at :  time at number provided:      []  Patient answered, conversation as follows:    [x]  Patient did not answer, message left as follows: pt informed he will be moved to same day scheduling status due to poor attendance  []  Phone call not made today  []  Phone call not needed - pt contacted us to cancel and provided reason for cancellation. Electronically signed by:   Tila Ayala, PT DPT, ATC

## 2023-10-10 ENCOUNTER — OFFICE VISIT (OUTPATIENT)
Dept: ORTHOPEDIC SURGERY | Age: 43
End: 2023-10-10

## 2023-10-10 VITALS — HEIGHT: 73 IN | WEIGHT: 264 LBS | BODY MASS INDEX: 34.99 KG/M2

## 2023-10-10 DIAGNOSIS — F17.200 CURRENT SMOKER: ICD-10-CM

## 2023-10-10 DIAGNOSIS — S82.872D CLOSED DISPLACED PILON FRACTURE OF LEFT TIBIA WITH ROUTINE HEALING, SUBSEQUENT ENCOUNTER: ICD-10-CM

## 2023-10-10 DIAGNOSIS — Z87.81 S/P ORIF (OPEN REDUCTION INTERNAL FIXATION) FRACTURE: Primary | ICD-10-CM

## 2023-10-10 DIAGNOSIS — Z98.890 S/P ORIF (OPEN REDUCTION INTERNAL FIXATION) FRACTURE: Primary | ICD-10-CM

## 2023-10-10 NOTE — PROGRESS NOTES
Smoking: Educated the patient regarding the hazards of smoking and that it harms their body in many ways. It increases the chance of developing heart disease, lung disease, cancer, and other health problems including poor bone and wound healing. The importance of smoking cessation for optimal bone and wound healing was stressed. This was communicated verbally, 5 Minutes.       Bethel Rivers MD

## 2024-06-07 NOTE — PROGRESS NOTES
Nephrology Progress Note  [phone]  [phone]   [email or web address]          CC:  We are following this patient for CKD    past medical history of CKD stage 3a and proteinuria followed by my partner, Frank Hester. The patient also has  DM type 2 with complications, pulmonary histoplasmosis, HTN, and chronic diastolic HF. The patient presented with febrile illness initially thought to be a viral syndrome but he developed a necrotizing abscess in his left thigh near the inguinal crease. He had surgical debridement on  with a re-look washout on 24. His course was complicated by some bleeding from the wound that was closed with suture. He also had CHELE with creatinine of 2.2 on admission. The CHELE resolved with IVF.     Intra-operative cultures grew E coli, Klebsiella, and beta strep. He is on therapy with oral Flagyl and IV cefepime, the latter needed for 2 weeks. He requires a PICC and we are asked to clear for this..       SUBJECTIVE:    Repeat debridement on  finding pus in deeper recesses    BS stable today  He feels overall better  VAC removed - to have new VAC at ECF  Apparently plans for ECF for dressing and wound care    No dyspnea, CP. No cough or wheezing. No N/V, abd pain, or diarrhea. No F/C.      No visitors      Physical Exam:    VITALS:  BP (!) 147/91   Pulse 93   Temp 98.9 °F (37.2 °C) (Oral)   Resp 19   Ht 1.854 m (6' 1\")   Wt 127.5 kg (281 lb 1.4 oz)   SpO2 95%   BMI 37.08 kg/m²   TEMPERATURE:  Current - Temp: 98.9 °F (37.2 °C); Max - Temp  Av.1 °F (37.3 °C)  Min: 98.8 °F (37.1 °C)  Max: 99.4 °F (37.4 °C)  PULSE RANGE: Pulse  Av.6  Min: 93  Max: 100  BLOOD PRESSURE RANGE:  Systolic (24hrs), Av , Min:111 , Max:147   ; Diastolic (24hrs), Av, Min:70, Max:95  PULSE OXIMETRY RANGE: SpO2  Av.3 %  Min: 95 %  Max: 98 %  24HR INTAKE/OUTPUT:    Intake/Output Summary (Last 24 hours) at 2024 1047  Last data filed at 2024 0557  Gross per 24 hour   Intake 342 ml  Pt admitted to ICU room 3904. He is med/surg level of care. Telemetry monitoring not ordered. Currently hypertensive, pt states he did not take his losartan yesterday or today, and he is also in constant pain. Pt oriented to room and unit policies. LLE wrapped in soft padding and posterior splint after reduction procedure in ED. Orthopedic surgery consulted for further surgical intervention, unsure as to whether that will happen today or tomorrow. Pt remains NPO at this time. Pt educated on available prns as well as goals of pain management. Fall precautions in place, pt has urinal at bedside to void and voices understanding to POC.

## 2024-09-05 ENCOUNTER — HOSPITAL ENCOUNTER (OUTPATIENT)
Age: 44
Discharge: HOME | End: 2024-09-05
Payer: COMMERCIAL

## 2024-09-05 ENCOUNTER — HOSPITAL ENCOUNTER (OUTPATIENT)
Dept: GENERAL RADIOLOGY | Age: 44
Discharge: HOME | End: 2024-09-05
Payer: COMMERCIAL

## 2024-09-05 DIAGNOSIS — M25.432 EFFUSION OF JOINT OF LEFT WRIST: ICD-10-CM

## 2024-09-05 PROCEDURE — 73110 X-RAY EXAM OF WRIST: CPT

## 2024-09-05 PROCEDURE — 73130 X-RAY EXAM OF HAND: CPT

## (undated) PROCEDURE — 0QSK04Z REPOSITION LEFT FIBULA WITH INTERNAL FIXATION DEVICE, OPEN APPROACH: ICD-10-PCS

## (undated) DEVICE — SHEET,DRAPE,53X77,STERILE: Brand: MEDLINE

## (undated) DEVICE — DRESSING,GAUZE,XEROFORM,CURAD,1"X8",ST: Brand: CURAD

## (undated) DEVICE — T-DRAPE,EXTREMITY,STERILE: Brand: MEDLINE

## (undated) DEVICE — BIT DRL DIA2.5MM LNG GRAD FOR ANK FRAC MGMT SYS

## (undated) DEVICE — MERCY FAIRFIELD TURNOVER KIT: Brand: MEDLINE INDUSTRIES, INC.

## (undated) DEVICE — ZIMMER® STERILE DISPOSABLE TOURNIQUET CUFF WITH PLC, DUAL PORT, SINGLE BLADDER, 34 IN. (86 CM)

## (undated) DEVICE — BIT DRL L110MM DIA2.5MM G QUIK CPL W/O STP REUSE

## (undated) DEVICE — BIT DRL L160MM DIA2.7MM CANN QUIK CPL ADJ STP REUSE FOR

## (undated) DEVICE — FRAZIER SUCTION INSTRUMENT 8 FR W/CONTROL VENT & OBTURATOR: Brand: FRAZIER

## (undated) DEVICE — SOLUTION IV IRRIG POUR BRL 0.9% SODIUM CHL 2F7124

## (undated) DEVICE — TOWEL,OR,DSP,ST,BLUE,STD,4/PK,20PK/CS: Brand: MEDLINE

## (undated) DEVICE — CAST BL 15X4IN SPECIALIST PLASTER

## (undated) DEVICE — PAD ABSRB W8XL10IN ABD HYDROPHOBIC NONWOVEN THCK LAYR CELOS

## (undated) DEVICE — SUTURE VCRL + SZ 2-0 L18IN ABSRB UD CT1 L36MM 1/2 CIR VCP839D

## (undated) DEVICE — SUTURE NONABSORBABLE MONOFILAMENT 3-0 PS-1 18 IN BLK ETHILON 1663H

## (undated) DEVICE — GUIDEWIRE ORTH L150MM DIA1.25MM S STL NTHRD FOR 4MM CANN

## (undated) DEVICE — STRIP,CLOSURE,WOUND,MEDI-STRIP,1/2X4: Brand: MEDLINE

## (undated) DEVICE — PADDING CAST W4INXL4YD ST COT RAYON MICROPLEATED HIGHLY

## (undated) DEVICE — C-ARM: Brand: UNBRANDED

## (undated) DEVICE — DRAPE,U/ SHT,SPLIT,PLAS,STERIL: Brand: MEDLINE

## (undated) DEVICE — SUTURE VCRL + SZ 1 L36IN ABSRB UD L36MM CT-1 1/2 CIR VCP947H

## (undated) DEVICE — BANDAGE,ELASTIC,ESMARK,STERILE,6"X9',LF: Brand: MEDLINE

## (undated) DEVICE — SYRINGE, LUER LOCK, 10ML: Brand: MEDLINE

## (undated) DEVICE — SUTURE MCRYL + SZ 4-0 L18IN ABSRB UD L19MM PS-2 3/8 CIR MCP496G

## (undated) DEVICE — Device

## (undated) DEVICE — BANDAGE COMPR W6INXL10YD ST M E WHITE/BEIGE

## (undated) DEVICE — BANDAGE COMPR W6INXL12FT SMOOTH FOR LIMB EXSANG ESMARCH

## (undated) DEVICE — DECANTER BAG 9": Brand: MEDLINE INDUSTRIES, INC.

## (undated) DEVICE — APPLICATOR MEDICATED 26 CC SOLUTION HI LT ORNG CHLORAPREP

## (undated) DEVICE — COTTON UNDERCAST PADDING,CRIMPED FINISH: Brand: WEBRIL

## (undated) DEVICE — NEEDLE HYPO 22GA L1 1/2IN PIVOTING SHLD FOR LUERLOCK SYR

## (undated) DEVICE — ELECTRODE PT RET AD L9FT HI MOIST COND ADH HYDRGEL CORDED

## (undated) DEVICE — STERILE LATEX POWDER-FREE SURGICAL GLOVESWITH NITRILE COATING: Brand: PROTEXIS

## (undated) DEVICE — SUTURE ETHLN SZ 3-0 L30IN NONABSORBABLE BLK L36MM FSLX 3/8 1673BH

## (undated) DEVICE — GAUZE,SPONGE,4"X4",8PLY,STRL,LF,10/TRAY: Brand: MEDLINE

## (undated) DEVICE — MAJOR SET UP PK

## (undated) DEVICE — TOWEL,OR,DSP,ST,BLUE,STD,6/PK,12PK/CS: Brand: MEDLINE

## (undated) DEVICE — LOWER EXTREMITY: Brand: MEDLINE INDUSTRIES, INC.

## (undated) DEVICE — SUTURE VCRL + SZ 3-0 L18IN ABSRB UD SH 1/2 CIR TAPERCUT NDL VCP864D

## (undated) DEVICE — GOWN SIRUS NONREIN XL W/TWL: Brand: MEDLINE INDUSTRIES, INC.

## (undated) DEVICE — INTENDED FOR TISSUE SEPARATION, AND OTHER PROCEDURES THAT REQUIRE A SHARP SURGICAL BLADE TO PUNCTURE OR CUT.: Brand: BARD-PARKER ® STAINLESS STEEL BLADES

## (undated) DEVICE — BIT DRL L100MM DIA2MM QUIK CPL W/O STP REUSE

## (undated) DEVICE — BIT DRL DIA2.5MM FOR ANK FRAC MGMT SYS

## (undated) DEVICE — COVER LT HNDL BLU PLAS

## (undated) DEVICE — HYPODERMIC SAFETY NEEDLE: Brand: MAGELLAN

## (undated) DEVICE — GLOVE SURG SZ 65 L12IN FNGR THK79MIL GRN LTX FREE

## (undated) DEVICE — 3M™ STERI-STRIP™ COMPOUND BENZOIN TINCTURE 40 BAGS/CARTON 4 CARTONS/CASE C1544: Brand: 3M™ STERI-STRIP™

## (undated) DEVICE — GLOVE 8 LTX ST TRIFLEX POWDER LK

## (undated) DEVICE — GLOVE SURG 9 PF CRM STRL SENSICARE PI MIC LF

## (undated) DEVICE — GUIDEWIRE ORTH L130MM DIA2MM W/ TRCR TIP FOR ANK FRAC MGMT

## (undated) DEVICE — DRESSING,GAUZE,XEROFORM,CURAD,5"X9",ST: Brand: CURAD

## (undated) DEVICE — GUIDEWIRE ORTH L150MM DIA0.053IN W/ TRCR TIP FOR ANK FRAC

## (undated) DEVICE — GLOVE SURG SZ 8 CRM LTX FREE POLYISOPRENE POLYMER BEAD ANTI

## (undated) DEVICE — GLOVE SURG SZ 65 THK91MIL LTX FREE SYN POLYISOPRENE